# Patient Record
Sex: MALE | Race: WHITE | NOT HISPANIC OR LATINO | Employment: UNEMPLOYED | URBAN - NONMETROPOLITAN AREA
[De-identification: names, ages, dates, MRNs, and addresses within clinical notes are randomized per-mention and may not be internally consistent; named-entity substitution may affect disease eponyms.]

---

## 2017-04-10 ENCOUNTER — PROCEDURE VISIT (OUTPATIENT)
Dept: OTOLARYNGOLOGY | Facility: CLINIC | Age: 57
End: 2017-04-10

## 2017-04-10 DIAGNOSIS — H90.5 HEARING LOSS, SENSORINEURAL: Primary | ICD-10-CM

## 2017-04-10 PROCEDURE — HEARINGNOCHG: Performed by: OTOLARYNGOLOGY

## 2017-04-10 NOTE — PROGRESS NOTES
Patient came in for a HA clean and check. Both aids were cleaned. Patient states he is not having any problems other than, his HA will occasionally turn up in volume on it's own. But he says this is very rare and will call if it becomes more frequent.    No charge per HA agreement.

## 2017-08-28 ENCOUNTER — OFFICE VISIT (OUTPATIENT)
Dept: GASTROENTEROLOGY | Facility: CLINIC | Age: 57
End: 2017-08-28

## 2017-08-28 VITALS
OXYGEN SATURATION: 98 % | HEART RATE: 69 BPM | WEIGHT: 191 LBS | HEIGHT: 72 IN | SYSTOLIC BLOOD PRESSURE: 120 MMHG | BODY MASS INDEX: 25.87 KG/M2 | DIASTOLIC BLOOD PRESSURE: 80 MMHG

## 2017-08-28 DIAGNOSIS — I10 HTN (HYPERTENSION), BENIGN: ICD-10-CM

## 2017-08-28 DIAGNOSIS — Z12.11 ENCOUNTER FOR SCREENING FOR MALIGNANT NEOPLASM OF COLON: Primary | ICD-10-CM

## 2017-08-28 DIAGNOSIS — Z71.6 TOBACCO ABUSE COUNSELING: ICD-10-CM

## 2017-08-28 PROCEDURE — S0285 CNSLT BEFORE SCREEN COLONOSC: HCPCS | Performed by: CLINICAL NURSE SPECIALIST

## 2017-08-28 RX ORDER — SODIUM, POTASSIUM,MAG SULFATES 17.5-3.13G
SOLUTION, RECONSTITUTED, ORAL ORAL
Qty: 2 BOTTLE | Refills: 0 | Status: SHIPPED | OUTPATIENT
Start: 2017-08-28 | End: 2017-11-07 | Stop reason: HOSPADM

## 2017-08-28 NOTE — PROGRESS NOTES
Cale Sebastian  1960 8/28/2017  Chief Complaint   Patient presents with   • Colonoscopy     Subjective   HPI  Cale Sebastian is a 57 y.o. male who presents as a referral for preventative maintenance. He has no complaints of nausea or vomiting. No change in bowels. No wt loss. No BRBPR. No melena. There is a negative family hx for colon cancer. No abdominal pain.  Past Medical History:   Diagnosis Date   • Cigarette nicotine dependence without complication    • Erectile dysfunction    • GERD (gastroesophageal reflux disease)    • Hypertension      Past Surgical History:   Procedure Laterality Date   • COLONOSCOPY  11/21/2012    Diverticulosis repeat exam in 5 years   • COLONOSCOPY  11/21/2012    Diverticulosis repeat exam in 5 years   • ENDOSCOPY  05/24/2016    LA Grade B Esohagitis repeat exam in 3 months   • ENDOSCOPY  11/26/2012    BERKLEY SALEH GEJ ESOPHAGITIS   • FOOT SURGERY Left     AS A CHILD   • HERNIA REPAIR      2ND GRADE     Outpatient Prescriptions Marked as Taking for the 8/28/17 encounter (Office Visit) with JAVIER Anthony   Medication Sig Dispense Refill   • amLODIPine (NORVASC) 5 MG tablet Take 5 mg by mouth daily.     • aspirin 81 MG tablet Take 81 mg by mouth daily.     • Omega-3 Fatty Acids (FISH OIL BURP-LESS) 1200 MG capsule Take  by mouth.     • pantoprazole (PROTONIX) 40 MG EC tablet Take 40 mg by mouth 2 (two) times a day. ONE TABLET DR TWO TIMES DAILY, #30, 30 DAYS STARTING 5/19/2016, REF X 10, MAIL ORDER #90 DAYS, NO REFILL       No Known Allergies  Social History     Social History   • Marital status:      Spouse name: N/A   • Number of children: N/A   • Years of education: N/A     Occupational History   • Not on file.     Social History Main Topics   • Smoking status: Current Every Day Smoker     Types: Electronic Cigarette   • Smokeless tobacco: Never Used   • Alcohol use Yes      Comment: MODERATE ALCOHOL/6 PACK PER WEEK   • Drug use: No   • Sexual activity: Not  "on file     Other Topics Concern   • Not on file     Social History Narrative     Family History   Problem Relation Age of Onset   • Stomach cancer Other    • Colon cancer Neg Hx    • Colon polyps Neg Hx      Health Maintenance   Topic Date Due   • PNEUMOCOCCAL VACCINE (19-64 MEDIUM RISK) (1 of 1 - PPSV23) 04/08/1981   • TDAP/TD VACCINES (1 - Tdap) 04/08/1981   • HEPATITIS C SCREENING  04/10/2017   • INFLUENZA VACCINE  09/01/2017   • COLONOSCOPY  11/21/2022       REVIEW OF SYSTEMS  General: well appearing, no fever chills or sweats, no unexplained wt loss  HEENT: no acute visual or hearing disturbances  Cardiovascular: No chest pain or palpitations  Pulmonary: No shortness of breath, coughing, wheezing or hemoptysis  : No burning, urgency, hematuria, or dysuria  Musculoskeletal: No joint pain or stiffness  Peripheral: no edema  Skin: No lesions or rashes  Neuro: No dizziness, headaches, stroke, syncope  Endocrine: No hot or cold intolerances  Hematological: No blood dyscrasias    Objective   Vitals:    08/28/17 0921   BP: 120/80   Pulse: 69   SpO2: 98%   Weight: 191 lb (86.6 kg)   Height: 72\" (182.9 cm)     Body mass index is 25.9 kg/(m^2).    PHYSICAL EXAM  General: age appropriate well nourished well appearing, no acute distress  Head: normocephalic and atraumatic  Global assessment-supple  Neck-No JVD noted, no lymphadenopathy  Pulmonary-clear to auscultation bilaterally, normal respiratory effort  Cardiovascular-normal rate and rhythm, normal heart sounds, S1 and S2 noted  Abdomen-soft, non tender, non distended, normal bowel sounds all 4 quadrants, no hepatosplenomegaly noted  Extremities-No clubbing cyanosis or edema  Neuro-Non focal, converses appropriately, awake, alert, oriented    Assessment/Plan     Cale was seen today for colonoscopy.    Diagnoses and all orders for this visit:    Encounter for screening for malignant neoplasm of colon  -     SUPREP BOWEL PREP KIT 17.5-3.13-1.6 GM/180ML solution oral " solution; Take as directed by office instructions provided  -     Case Request; Standing  -     Implement Anesthesia Orders Day of Procedure; Standing  -     Obtain Informed Consent; Standing  -     Verify bowel prep was successful; Standing  -     Case Request    HTN (hypertension), benign  Comments:  cont BP medication the am of procedure    Tobacco abuse counseling        COLONOSCOPY WITH ANESTHESIA (N/A)  Body mass index is 25.9 kg/(m^2).    Patient instructions on prep prior to procedure provided to the patient.    All risks, benefits, alternatives, and indications of colonoscopy procedure have been discussed with the patient. Risks to include perforation of the colon requiring possible surgery or colostomy, risk of bleeding from biopsies or removal of colon tissue, possibility of missing a colon polyp or cancer, or adverse drug reaction.  Benefits to include the diagnosis and management of disease of the colon and rectum. Alternatives to include barium enema, radiographic evaluation, lab testing or no intervention. Pt verbalizes understanding and agrees.

## 2017-11-07 ENCOUNTER — ANESTHESIA (OUTPATIENT)
Dept: GASTROENTEROLOGY | Facility: HOSPITAL | Age: 57
End: 2017-11-07

## 2017-11-07 ENCOUNTER — ANESTHESIA EVENT (OUTPATIENT)
Dept: GASTROENTEROLOGY | Facility: HOSPITAL | Age: 57
End: 2017-11-07

## 2017-11-07 ENCOUNTER — HOSPITAL ENCOUNTER (OUTPATIENT)
Facility: HOSPITAL | Age: 57
Setting detail: HOSPITAL OUTPATIENT SURGERY
Discharge: HOME | End: 2017-11-07
Attending: INTERNAL MEDICINE | Admitting: INTERNAL MEDICINE

## 2017-11-07 VITALS
RESPIRATION RATE: 13 BRPM | HEIGHT: 72 IN | TEMPERATURE: 97 F | OXYGEN SATURATION: 98 % | BODY MASS INDEX: 25.47 KG/M2 | WEIGHT: 188 LBS | DIASTOLIC BLOOD PRESSURE: 76 MMHG | SYSTOLIC BLOOD PRESSURE: 130 MMHG | HEART RATE: 59 BPM

## 2017-11-07 DIAGNOSIS — Z12.11 ENCOUNTER FOR SCREENING FOR MALIGNANT NEOPLASM OF COLON: ICD-10-CM

## 2017-11-07 PROCEDURE — G0121 COLON CA SCRN NOT HI RSK IND: HCPCS | Performed by: INTERNAL MEDICINE

## 2017-11-07 PROCEDURE — 25010000002 PROPOFOL 10 MG/ML EMULSION: Performed by: NURSE ANESTHETIST, CERTIFIED REGISTERED

## 2017-11-07 RX ORDER — SODIUM CHLORIDE 9 MG/ML
500 INJECTION, SOLUTION INTRAVENOUS CONTINUOUS PRN
Status: DISCONTINUED | OUTPATIENT
Start: 2017-11-07 | End: 2017-11-07 | Stop reason: HOSPADM

## 2017-11-07 RX ORDER — PROPOFOL 10 MG/ML
VIAL (ML) INTRAVENOUS AS NEEDED
Status: DISCONTINUED | OUTPATIENT
Start: 2017-11-07 | End: 2017-11-07 | Stop reason: SURG

## 2017-11-07 RX ORDER — SODIUM CHLORIDE 0.9 % (FLUSH) 0.9 %
3 SYRINGE (ML) INJECTION AS NEEDED
Status: DISCONTINUED | OUTPATIENT
Start: 2017-11-07 | End: 2017-11-07 | Stop reason: HOSPADM

## 2017-11-07 RX ORDER — LIDOCAINE HYDROCHLORIDE 20 MG/ML
INJECTION, SOLUTION INFILTRATION; PERINEURAL AS NEEDED
Status: DISCONTINUED | OUTPATIENT
Start: 2017-11-07 | End: 2017-11-07 | Stop reason: SURG

## 2017-11-07 RX ADMIN — PROPOFOL 50 MG: 10 INJECTION, EMULSION INTRAVENOUS at 09:06

## 2017-11-07 RX ADMIN — LIDOCAINE HYDROCHLORIDE 50 MG: 20 INJECTION, SOLUTION INFILTRATION; PERINEURAL at 08:52

## 2017-11-07 RX ADMIN — PROPOFOL 70 MG: 10 INJECTION, EMULSION INTRAVENOUS at 08:57

## 2017-11-07 RX ADMIN — SODIUM CHLORIDE 500 ML: 9 INJECTION, SOLUTION INTRAVENOUS at 07:54

## 2017-11-07 RX ADMIN — PROPOFOL 80 MG: 10 INJECTION, EMULSION INTRAVENOUS at 08:52

## 2017-11-07 RX ADMIN — PROPOFOL 70 MG: 10 INJECTION, EMULSION INTRAVENOUS at 09:01

## 2017-11-07 NOTE — PLAN OF CARE
Problem: GI Endoscopy (Adult)  Goal: Signs and Symptoms of Listed Potential Problems Will be Absent or Manageable (GI Endoscopy)  Outcome: Outcome(s) achieved Date Met:  11/07/17 11/07/17 0915   GI Endoscopy   Problems Assessed (GI Endoscopy) all   Problems Present (GI Endoscopy) none

## 2017-11-07 NOTE — ANESTHESIA PREPROCEDURE EVALUATION
Anesthesia Evaluation     Patient summary reviewed   no history of anesthetic complications:         Airway   Mallampati: II  TM distance: >3 FB  Neck ROM: full  no difficulty expected  Dental - normal exam     Pulmonary    (+) a smoker (vaper) Current,   (-) COPD, asthma, sleep apnea  Cardiovascular   Exercise tolerance: good (4-7 METS)    (+) hypertension,       Neuro/Psych  (-) seizures, TIA, CVA  GI/Hepatic/Renal/Endo    (+)  GERD,   (-) liver disease, no renal disease, diabetes    Musculoskeletal     Abdominal    Substance History      OB/GYN          Other                                        Anesthesia Plan    ASA 2     general   total IV anesthesia  intravenous induction   Anesthetic plan and risks discussed with patient.

## 2017-11-07 NOTE — PLAN OF CARE
Problem: Patient Care Overview (Adult)  Goal: Plan of Care Review  Outcome: Ongoing (interventions implemented as appropriate)    11/07/17 0854   Coping/Psychosocial Response Interventions   Plan Of Care Reviewed With patient   Patient Care Overview   Progress no change   Outcome Evaluation   Outcome Summary/Follow up Plan pt tolerated procedure well.

## 2017-11-07 NOTE — PLAN OF CARE
Problem: Patient Care Overview (Adult)  Goal: Plan of Care Review  Outcome: Outcome(s) achieved Date Met:  11/07/17 11/07/17 0915   Coping/Psychosocial Response Interventions   Plan Of Care Reviewed With patient;spouse   Patient Care Overview   Progress improving   Outcome Evaluation   Outcome Summary/Follow up Plan discharge criteria met

## 2017-11-07 NOTE — ANESTHESIA POSTPROCEDURE EVALUATION
Patient: Cale Sebastian    Procedure Summary     Date Anesthesia Start Anesthesia Stop Room / Location    11/07/17 0850 0914  [facility] /  [facility]       Procedure Diagnosis Surgeon Provider    COLONOSCOPY WITH ANESTHESIA (N/A ) Encounter for screening for malignant neoplasm of colon  (Encounter for screening for malignant neoplasm of colon [Z12.11]) MD Duane Nguyễn CRNA          Anesthesia Type: general  Last vitals  BP   102/65 (11/07/17 0912)   Temp   97 °F (36.1 °C) (11/07/17 0741)   Pulse   64 (11/07/17 0912)   Resp   16 (11/07/17 0912)     SpO2   96 % (11/07/17 0912)     Post Anesthesia Care and Evaluation    Patient location during evaluation: PHASE II  Patient participation: complete - patient participated  Level of consciousness: awake  Pain score: 1  Pain management: adequate  Airway patency: patent  Anesthetic complications: No anesthetic complications  PONV Status: none  Cardiovascular status: acceptable  Respiratory status: acceptable  Hydration status: acceptable

## 2017-11-07 NOTE — H&P
"[facility]  Pre Procedure History & Physical    Chief Complaint:   Screening    Subjective     HPI:   Here for colonoscopy.  Strong family history of gastric cancer.  Here for colonoscopy for screening    Past Medical History:   Past Medical History:   Diagnosis Date   • Cigarette nicotine dependence without complication    • Erectile dysfunction    • GERD (gastroesophageal reflux disease)    • Hypertension        Past Surgical History:  [unfilled]    Family History:  Family History   Problem Relation Age of Onset   • Stomach cancer Other    • Colon cancer Neg Hx    • Colon polyps Neg Hx        Social History:   reports that he has been smoking Electronic Cigarette.  He has never used smokeless tobacco. He reports that he drinks alcohol. He reports that he does not use illicit drugs.    Medications:   Prior to Admission medications    Medication Sig Start Date End Date Taking? Authorizing Provider   amLODIPine (NORVASC) 5 MG tablet Take 5 mg by mouth daily.   Yes Historical Provider, MD   aspirin 81 MG tablet Take 81 mg by mouth daily.    Historical Provider, MD   Omega-3 Fatty Acids (FISH OIL BURP-LESS) 1200 MG capsule Take  by mouth.    Historical Provider, MD   pantoprazole (PROTONIX) 40 MG EC tablet Take 40 mg by mouth 2 (two) times a day. ONE TABLET DR TWO TIMES DAILY, #30, 30 DAYS STARTING 5/19/2016, REF X 10, MAIL ORDER #90 DAYS, NO REFILL    Historical Provider, MD   SUPREP BOWEL PREP KIT 17.5-3.13-1.6 GM/180ML solution oral solution Take as directed by office instructions provided 8/28/17   JAVIER Anthony       Allergies:  Review of patient's allergies indicates no known allergies.    Objective     Blood pressure 151/85, pulse 63, temperature 97 °F (36.1 °C), temperature source Temporal Artery , resp. rate 20, height 72\" (182.9 cm), weight 188 lb (85.3 kg), SpO2 99 %.    Physical Exam   Constitutional: Pt is oriented to person, place, and in no distress.   HENT: Mouth/Throat: " Oropharynx is clear.   Cardiovascular: Normal rate, regular rhythm.    Pulmonary/Chest: Effort normal. No respiratory distress. No  wheezes.   Abdominal: Soft. Non-distended.  Skin: Skin is warm and dry.   Psychiatric: Mood, memory, affect and judgment appear normal.     Assessment/Plan     Diagnosis:  Screening colonoscopy    Anticipated Surgical Procedure:    Proceed with colonoscopy as scheduled    The following major R/B/A were discussed with the patient, however the list is not all inclusive . Risk:  Bleeding (immediate and delayed), perforation (rupture or tear), reaction to medication, missed lesion/cancer, pain during the procedure, infection, need for surgery, need for ostomy, need for mechanical ventilation (breathing machine), death.  Benefits: removal of polyp/tissue, burn/clip/or inject to stop bleeding, removal of foreign body, dilate any stricture.  Alternatives: Xray or CT, surgery, do nothing with associated risk   The patient was given time to ask question and received explanation, and agrees to proceed as per History and Physical.   No guarantee given or expressed.    EMR Dragon/transcription disclaimer: Much of this encounter note is an electronic transcription/translation of spoken language to printed text.  The electronic translation of spoken language may permit erroneous, or at times, nonsensical words or phrases to be inadvertently transcribed.  Although I have reviewed the note for such errors, some may still exist.    Terrence Wood MD  8:52 AM  11/7/2017

## 2017-11-16 ENCOUNTER — TELEPHONE (OUTPATIENT)
Dept: GASTROENTEROLOGY | Facility: CLINIC | Age: 57
End: 2017-11-16

## 2018-01-21 DIAGNOSIS — K21.9 GASTROESOPHAGEAL REFLUX DISEASE, ESOPHAGITIS PRESENCE NOT SPECIFIED: Primary | ICD-10-CM

## 2018-01-22 RX ORDER — SUCRALFATE 1 G/1
TABLET ORAL
Qty: 120 TABLET | Refills: 5 | Status: SHIPPED | OUTPATIENT
Start: 2018-01-22 | End: 2019-02-14

## 2019-02-14 ENCOUNTER — APPOINTMENT (OUTPATIENT)
Dept: PREADMISSION TESTING | Facility: HOSPITAL | Age: 59
End: 2019-02-14

## 2019-02-14 VITALS
RESPIRATION RATE: 20 BRPM | DIASTOLIC BLOOD PRESSURE: 67 MMHG | BODY MASS INDEX: 25.14 KG/M2 | HEIGHT: 72 IN | HEART RATE: 77 BPM | OXYGEN SATURATION: 98 % | WEIGHT: 185.63 LBS | SYSTOLIC BLOOD PRESSURE: 111 MMHG

## 2019-02-14 LAB
ALBUMIN SERPL-MCNC: 4.1 G/DL (ref 3.5–5)
ALBUMIN/GLOB SERPL: 1.9 G/DL (ref 1.1–2.5)
ALP SERPL-CCNC: 66 U/L (ref 24–120)
ALT SERPL W P-5'-P-CCNC: 27 U/L (ref 0–54)
ANION GAP SERPL CALCULATED.3IONS-SCNC: 6 MMOL/L (ref 4–13)
AST SERPL-CCNC: 24 U/L (ref 7–45)
BASOPHILS # BLD AUTO: 0.06 10*3/MM3 (ref 0–0.2)
BASOPHILS NFR BLD AUTO: 0.8 % (ref 0–2)
BILIRUB SERPL-MCNC: 0.4 MG/DL (ref 0.1–1)
BUN BLD-MCNC: 18 MG/DL (ref 5–21)
BUN/CREAT SERPL: 18 (ref 7–25)
CALCIUM SPEC-SCNC: 8.9 MG/DL (ref 8.4–10.4)
CHLORIDE SERPL-SCNC: 106 MMOL/L (ref 98–110)
CO2 SERPL-SCNC: 27 MMOL/L (ref 24–31)
CREAT BLD-MCNC: 1 MG/DL (ref 0.5–1.4)
DEPRECATED RDW RBC AUTO: 44.9 FL (ref 40–54)
EOSINOPHIL # BLD AUTO: 0.38 10*3/MM3 (ref 0–0.7)
EOSINOPHIL NFR BLD AUTO: 5.3 % (ref 0–4)
ERYTHROCYTE [DISTWIDTH] IN BLOOD BY AUTOMATED COUNT: 13.2 % (ref 12–15)
GFR SERPL CREATININE-BSD FRML MDRD: 77 ML/MIN/1.73
GLOBULIN UR ELPH-MCNC: 2.2 GM/DL
GLUCOSE BLD-MCNC: 97 MG/DL (ref 70–100)
HCT VFR BLD AUTO: 46.8 % (ref 40–52)
HGB BLD-MCNC: 16.2 G/DL (ref 14–18)
IMM GRANULOCYTES # BLD AUTO: 0.02 10*3/MM3 (ref 0–0.05)
IMM GRANULOCYTES NFR BLD AUTO: 0.3 % (ref 0–5)
LYMPHOCYTES # BLD AUTO: 1.59 10*3/MM3 (ref 0.72–4.86)
LYMPHOCYTES NFR BLD AUTO: 22.2 % (ref 15–45)
MCH RBC QN AUTO: 32.1 PG (ref 28–32)
MCHC RBC AUTO-ENTMCNC: 34.6 G/DL (ref 33–36)
MCV RBC AUTO: 92.7 FL (ref 82–95)
MONOCYTES # BLD AUTO: 0.87 10*3/MM3 (ref 0.19–1.3)
MONOCYTES NFR BLD AUTO: 12.2 % (ref 4–12)
NEUTROPHILS # BLD AUTO: 4.23 10*3/MM3 (ref 1.87–8.4)
NEUTROPHILS NFR BLD AUTO: 59.2 % (ref 39–78)
NRBC BLD AUTO-RTO: 0 /100 WBC (ref 0–0)
PLATELET # BLD AUTO: 295 10*3/MM3 (ref 130–400)
PMV BLD AUTO: 9.9 FL (ref 6–12)
POTASSIUM BLD-SCNC: 4.4 MMOL/L (ref 3.5–5.3)
PROT SERPL-MCNC: 6.3 G/DL (ref 6.3–8.7)
RBC # BLD AUTO: 5.05 10*6/MM3 (ref 4.8–5.9)
SODIUM BLD-SCNC: 139 MMOL/L (ref 135–145)
WBC NRBC COR # BLD: 7.15 10*3/MM3 (ref 4.8–10.8)

## 2019-02-14 PROCEDURE — 85025 COMPLETE CBC W/AUTO DIFF WBC: CPT | Performed by: SPECIALIST

## 2019-02-14 PROCEDURE — 93010 ELECTROCARDIOGRAM REPORT: CPT | Performed by: INTERNAL MEDICINE

## 2019-02-14 PROCEDURE — 80053 COMPREHEN METABOLIC PANEL: CPT | Performed by: SPECIALIST

## 2019-02-14 PROCEDURE — 93005 ELECTROCARDIOGRAM TRACING: CPT

## 2019-02-14 PROCEDURE — 36415 COLL VENOUS BLD VENIPUNCTURE: CPT

## 2019-02-14 NOTE — DISCHARGE INSTRUCTIONS
DAY OF SURGERY INSTRUCTIONS        YOUR SURGEON: ***Dr. CHRIST Meza    PROCEDURE: ***Left inguinal hernia    DATE OF SURGERY: ***2/21/2019    ARRIVAL TIME: AS DIRECTED BY OFFICE    YOU MAY TAKE THE FOLLOWING MEDICATION(S) THE MORNING OF SURGERY WITH A SIP OF WATER: ***amlodipine                MANAGING PAIN AFTER SURGERY    We know you are probably wondering what your pain will be like after surgery.  Following surgery it is unrealistic to expect you will not have pain.   Pain is how our bodies let us know that something is wrong or cautions us to be careful.  That said, our goal is to make your pain tolerable.    Methods we may use to treat your pain include (oral or IV medications, PCAs, epidurals, nerve blocks, etc.)   While some procedures require IV pain medications for a short time after surgery, transitioning to pain medications by mouth allows for better management of pain.   Your nurse will encourage you to take oral pain medications whenever possible.  IV medications work almost immediately, but only last a short while.  Taking medications by mouth allows for a more constant level of medication in your blood stream for a longer period of time.      Once your pain is out of control it is harder to get back under control.  It is important you are aware when your next dose of pain medication is due.  If you are admitted, your nurse may write the time of your next dose on the white board in your room to help you remember.      We are interested in your pain and encourage you to inform us about aggravating factors during your visit.   Many times a simple repositioning every few hours can make a big difference.    If your physician says it is okay, do not let your pain prevent you from getting out of bed. Be sure to call your nurse for assistance prior to getting up so you do not fall.      Before surgery, please decide your tolerable pain goal.  These faces help describe the pain ratings we use on a 0-10 scale.    Be prepared to tell us your goal and whether or not you take pain or anxiety medications at home.          BEFORE YOU COME TO THE HOSPITAL  (Pre-op instructions)  • Do not eat, drink, smoke or chew gum after midnight the night before surgery.  This also includes no mints.  • Morning of surgery take only the medicines you have been instructed with a sip of water unless otherwise instructed  by your physician.  • Do not shave, wear makeup or dark nail polish.  • Remove all jewelry including rings.  • Leave anything you consider valuable at home.  • Leave your suitcase in the car until after your surgery.  • Bring the following with you if applicable:  o Picture ID and insurance, Medicare or Medicaid cards  o Co-pay/deductible required by insurance (cash, check, credit card)  o Copy of advance directive, living will or power-of- documents if not brought to PAT  o CPAP or BIPAP mask and tubing  o Relaxation aids (MP3 player, book, magazine)  • On the day of surgery check in at registration located at the main entrance of the hospital.       Outpatient Surgery Guidelines, Adult  Outpatient procedures are those for which the person having the procedure is allowed to go home the same day as the procedure. Various procedures are done on an outpatient basis. You should follow some general guidelines if you will be having an outpatient procedure.  LET YOUR HEALTH CARE PROVIDER KNOW ABOUT:  · Any allergies you have.  · All medicines you are taking, including vitamins, herbs, eye drops, creams, and over-the-counter medicines.  · Previous problems you or members of your family have had with the use of anesthetics.  · Any blood disorders you have.  · Previous surgeries you have had.  · Medical conditions you have.  RISKS AND COMPLICATIONS  Your health care provider will discuss possible risks and complications with you before surgery. Common risks and complications include:    · Problems due to the use of  anesthetics.  · Blood loss and replacement (does not apply to minor surgical procedures).  · Temporary increase in pain due to surgery.  · Uncorrected pain or problems that the surgery was meant to correct.  · Infection.  · New damage.  BEFORE THE PROCEDURE  · Ask your health care provider about changing or stopping your regular medicines. You may need to stop taking certain medicines in the days or weeks before the procedure.  · Stop smoking at least 2 weeks before surgery. This lowers your risk for complications during and after surgery. Ask your health care provider for help with this if needed.  · Eat your usual meals and a light supper the day before surgery. Continue fluid intake. Do not drink alcohol.  · Do not eat or drink after midnight the night before your surgery.   · Arrange for someone to take you home and to stay with you for 24 hours after the procedure. Medicine given for your procedure may affect your ability to drive or to care for yourself.  · Call your health care provider's office if you develop an illness or problem that may prevent you from safely having your procedure.  AFTER THE PROCEDURE  After surgery, you will be taken to a recovery area, where your progress will be monitored. If there are no complications, you will be allowed to go home when you are awake, stable, and taking fluids well. You may have numbness around the surgical site. Healing will take some time. You will have tenderness at the surgical site and may have some swelling and bruising. You may also have some nausea.  HOME CARE INSTRUCTIONS  · Do not drive for 24 hours, or as directed by your health care provider. Do not drive while taking prescription pain medicines.  · Do not drink alcohol for 24 hours.  · Do not make important decisions or sign legal documents for 24 hours.  · You may resume a normal diet and activities as directed.  · Do not lift anything heavier than 10 pounds (4.5 kg) or play contact sports until your  health care provider says it is okay.  · Change your bandages (dressings) as directed.  · Only take over-the-counter or prescription medicines as directed by your health care provider.  · Follow up with your health care provider as directed.  SEEK MEDICAL CARE IF:  · You have increased bleeding (more than a small spot) from the surgical site.  · You have redness, swelling, or increasing pain in the wound.  · You see pus coming from the wound.  · You have a fever.  · You notice a bad smell coming from the wound or dressing.  · You feel lightheaded or faint.  · You develop a rash.  · You have trouble breathing.  · You develop allergies.  MAKE SURE YOU:  · Understand these instructions.  · Will watch your condition.  · Will get help right away if you are not doing well or get worse.     This information is not intended to replace advice given to you by your health care provider. Make sure you discuss any questions you have with your health care provider.     Document Released: 09/12/2002 Document Revised: 05/03/2016 Document Reviewed: 05/22/2014  [employer] Interactive Patient Education ©2016 [employer]       Fall Prevention in Hospitals, Adult  As a hospital patient, your condition and the treatments you receive can increase your risk for falls. Some additional risk factors for falls in a hospital include:  · Being in an unfamiliar environment.  · Being on bed rest.  · Your surgery.  · Taking certain medicines.  · Your tubing requirements, such as intravenous (IV) therapy or catheters.  It is important that you learn how to decrease fall risks while at the hospital. Below are important tips that can help prevent falls.  SAFETY TIPS FOR PREVENTING FALLS  Talk about your risk of falling.  · Ask your health care provider why you are at risk for falling. Is it your medicine, illness, tubing placement, or something else?  · Make a plan with your health care provider to keep you safe from falls.  · Ask your health care  provider or pharmacist about side effects of your medicines. Some medicines can make you dizzy or affect your coordination.  Ask for help.  · Ask for help before getting out of bed. You may need to press your call button.  · Ask for assistance in getting safely to the toilet.  · Ask for a walker or cane to be put at your bedside. Ask that most of the side rails on your bed be placed up before your health care provider leaves the room.  · Ask family or friends to sit with you.  · Ask for things that are out of your reach, such as your glasses, hearing aids, telephone, bedside table, or call button.  Follow these tips to avoid falling:  · Stay lying or seated, rather than standing, while waiting for help.  · Wear rubber-soled slippers or shoes whenever you walk in the hospital.  · Avoid quick, sudden movements.  ¨ Change positions slowly.  ¨ Sit on the side of your bed before standing.  ¨ Stand up slowly and wait before you start to walk.  · Let your health care provider know if there is a spill on the floor.  · Pay careful attention to the medical equipment, electrical cords, and tubes around you.  · When you need help, use your call button by your bed or in the bathroom. Wait for one of your health care providers to help you.  · If you feel dizzy or unsure of your footing, return to bed and wait for assistance.  · Avoid being distracted by the TV, telephone, or another person in your room.  · Do not lean or support yourself on rolling objects, such as IV poles or bedside tables.     This information is not intended to replace advice given to you by your health care provider. Make sure you discuss any questions you have with your health care provider.     Document Released: 12/15/2001 Document Revised: 01/08/2016 Document Reviewed: 08/25/2013  [employer] Interactive Patient Education ©2016 [employer]       Surgical Site Infections FAQs  What is a Surgical Site Infection (SSI)?  A surgical site infection is an  infection that occurs after surgery in the part of the body where the surgery took place. Most patients who have surgery do not develop an infection. However, infections develop in about 1 to 3 out of every 100 patients who have surgery.  Some of the common symptoms of a surgical site infection are:  · Redness and pain around the area where you had surgery  · Drainage of cloudy fluid from your surgical wound  · Fever  Can SSIs be treated?  Yes. Most surgical site infections can be treated with antibiotics. The antibiotic given to you depends on the bacteria (germs) causing the infection. Sometimes patients with SSIs also need another surgery to treat the infection.  What are some of the things that hospitals are doing to prevent SSIs?  To prevent SSIs, doctors, nurses, and other healthcare providers:  · Clean their hands and arms up to their elbows with an antiseptic agent just before the surgery.  · Clean their hands with soap and water or an alcohol-based hand rub before and after caring for each patient.  · May remove some of your hair immediately before your surgery using electric clippers if the hair is in the same area where the procedure will occur. They should not shave you with a razor.  · Wear special hair covers, masks, gowns, and gloves during surgery to keep the surgery area clean.  · Give you antibiotics before your surgery starts. In most cases, you should get antibiotics within 60 minutes before the surgery starts and the antibiotics should be stopped within 24 hours after surgery.  · Clean the skin at the site of your surgery with a special soap that kills germs.  What can I do to help prevent SSIs?  Before your surgery:  · Tell your doctor about other medical problems you may have. Health problems such as allergies, diabetes, and obesity could affect your surgery and your treatment.  · Quit smoking. Patients who smoke get more infections. Talk to your doctor about how you can quit before your  surgery.  · Do not shave near where you will have surgery. Shaving with a razor can irritate your skin and make it easier to develop an infection.  At the time of your surgery:  · Speak up if someone tries to shave you with a razor before surgery. Ask why you need to be shaved and talk with your surgeon if you have any concerns.  · Ask if you will get antibiotics before surgery.  After your surgery:  · Make sure that your healthcare providers clean their hands before examining you, either with soap and water or an alcohol-based hand rub.  · If you do not see your providers clean their hands, please ask them to do so.  · Family and friends who visit you should not touch the surgical wound or dressings.  · Family and friends should clean their hands with soap and water or an alcohol-based hand rub before and after visiting you. If you do not see them clean their hands, ask them to clean their hands.  What do I need to do when I go home from the hospital?  · Before you go home, your doctor or nurse should explain everything you need to know about taking care of your wound. Make sure you understand how to care for your wound before you leave the hospital.  · Always clean your hands before and after caring for your wound.  · Before you go home, make sure you know who to contact if you have questions or problems after you get home.  · If you have any symptoms of an infection, such as redness and pain at the surgery site, drainage, or fever, call your doctor immediately.  If you have additional questions, please ask your doctor or nurse.  Developed and co-sponsored by The Society for Healthcare Epidemiology of [place] (SHEA); Infectious Diseases Society of [place] (IDSA); American Hospital Association; Association for Professionals in Infection Control and Epidemiology (APIC); Centers for Disease Control and Prevention (CDC); and The Joint Commission.     This information is not intended to replace advice given to you by  your health care provider. Make sure you discuss any questions you have with your health care provider.     Document Released: 12/23/2014 Document Revised: 01/08/2016 Document Reviewed: 03/02/2016  [employer] Interactive Patient Education ©2016 [employer]       [facility]  CHG 4% Patient Instruction Sheet    Preparing the Skin Before Surgery  Preparing or “prepping” skin before surgery can reduce the risk of infection at the surgical site. To make the process easier,[facility] has chosen 4% Chlorhexidine Gluconate (CHG) antiseptic solution.   The steps below outline the prepping process and should be carefully followed.                                                                                                                                                      Prep the skin at the following time(s):                                                      We recommend you shower the night before surgery, and again the morning of surgery with the 4% CHG antiseptic solution using half of the bottle and a cloth each time.  Dress in clean clothes/sleepwear after showering.  See instructions below for application.          Do not apply any lotions or moisturizers.       Do not shave the area to be prepped for at least 2 days prior to surgery.    Clipping the hair may be done immediately prior to your surgery at the hospital    if needed.    Directions:  Thoroughly rinse your body with water.  Apply 4% CHG to a cloth and wash skin gently, paying special attention to the operative site.  Rinse again thoroughly.  Once you have begun using this product do not apply anything else to your skin. If itching or redness persists, rinse affected areas and discontinue use.    When using this product:  • Keep out of eyes, ears, and mouth.  • If solution should contact these areas, rinse out promptly and thoroughly with water.  • For external use only.  • Do not use in genital area, on your face or head.

## 2019-02-21 ENCOUNTER — ANESTHESIA EVENT (OUTPATIENT)
Dept: PERIOP | Facility: HOSPITAL | Age: 59
End: 2019-02-21

## 2019-02-21 ENCOUNTER — HOSPITAL ENCOUNTER (OUTPATIENT)
Facility: HOSPITAL | Age: 59
Setting detail: HOSPITAL OUTPATIENT SURGERY
Discharge: HOME | End: 2019-02-21
Attending: SPECIALIST | Admitting: SPECIALIST

## 2019-02-21 ENCOUNTER — ANESTHESIA (OUTPATIENT)
Dept: PERIOP | Facility: HOSPITAL | Age: 59
End: 2019-02-21

## 2019-02-21 VITALS
SYSTOLIC BLOOD PRESSURE: 121 MMHG | TEMPERATURE: 98.1 F | DIASTOLIC BLOOD PRESSURE: 71 MMHG | RESPIRATION RATE: 14 BRPM | HEART RATE: 59 BPM | OXYGEN SATURATION: 95 %

## 2019-02-21 PROCEDURE — 25010000002 VANCOMYCIN 1 G RECONSTITUTED SOLUTION 1 EACH VIAL: Performed by: SPECIALIST

## 2019-02-21 PROCEDURE — 25010000002 SUCCINYLCHOLINE PER 20 MG: Performed by: NURSE ANESTHETIST, CERTIFIED REGISTERED

## 2019-02-21 PROCEDURE — 25010000002 DEXAMETHASONE PER 1 MG: Performed by: NURSE ANESTHETIST, CERTIFIED REGISTERED

## 2019-02-21 PROCEDURE — 25010000002 MIDAZOLAM PER 1 MG: Performed by: ANESTHESIOLOGY

## 2019-02-21 PROCEDURE — 25010000002 PROPOFOL 10 MG/ML EMULSION: Performed by: NURSE ANESTHETIST, CERTIFIED REGISTERED

## 2019-02-21 PROCEDURE — 25010000002 ROPIVACAINE PER 1 MG: Performed by: ANESTHESIOLOGY

## 2019-02-21 PROCEDURE — 25010000002 FENTANYL CITRATE (PF) 100 MCG/2ML SOLUTION: Performed by: NURSE ANESTHETIST, CERTIFIED REGISTERED

## 2019-02-21 PROCEDURE — 25010000002 ONDANSETRON PER 1 MG: Performed by: ANESTHESIOLOGY

## 2019-02-21 PROCEDURE — C1781 MESH (IMPLANTABLE): HCPCS | Performed by: SPECIALIST

## 2019-02-21 PROCEDURE — 25010000002 ONDANSETRON PER 1 MG: Performed by: NURSE ANESTHETIST, CERTIFIED REGISTERED

## 2019-02-21 PROCEDURE — 25010000002 NEOSTIGMINE PER 0.5 MG: Performed by: NURSE ANESTHETIST, CERTIFIED REGISTERED

## 2019-02-21 PROCEDURE — 25010000003 CEFAZOLIN PER 500 MG: Performed by: NURSE ANESTHETIST, CERTIFIED REGISTERED

## 2019-02-21 PROCEDURE — 25010000002 FENTANYL CITRATE (PF) 100 MCG/2ML SOLUTION: Performed by: ANESTHESIOLOGY

## 2019-02-21 DEVICE — BARD MESH PERFIX PLUG, EXTRA LARGE
Type: IMPLANTABLE DEVICE | Status: FUNCTIONAL
Brand: BARD MESH PERFIX PLUG

## 2019-02-21 RX ORDER — SODIUM CHLORIDE, SODIUM LACTATE, POTASSIUM CHLORIDE, CALCIUM CHLORIDE 600; 310; 30; 20 MG/100ML; MG/100ML; MG/100ML; MG/100ML
1000 INJECTION, SOLUTION INTRAVENOUS CONTINUOUS
Status: DISCONTINUED | OUTPATIENT
Start: 2019-02-21 | End: 2019-02-21 | Stop reason: HOSPADM

## 2019-02-21 RX ORDER — HYDROCODONE BITARTRATE AND ACETAMINOPHEN 5; 325 MG/1; MG/1
1-2 TABLET ORAL EVERY 4 HOURS PRN
Qty: 30 TABLET | Refills: 0 | Status: SHIPPED | OUTPATIENT
Start: 2019-02-21 | End: 2021-01-20

## 2019-02-21 RX ORDER — SODIUM CHLORIDE 0.9 % (FLUSH) 0.9 %
3 SYRINGE (ML) INJECTION EVERY 12 HOURS SCHEDULED
Status: DISCONTINUED | OUTPATIENT
Start: 2019-02-21 | End: 2019-02-21 | Stop reason: HOSPADM

## 2019-02-21 RX ORDER — ROPIVACAINE HYDROCHLORIDE 2 MG/ML
INJECTION, SOLUTION EPIDURAL; INFILTRATION; PERINEURAL
Status: COMPLETED | OUTPATIENT
Start: 2019-02-21 | End: 2019-02-21

## 2019-02-21 RX ORDER — IBUPROFEN 600 MG/1
600 TABLET ORAL ONCE AS NEEDED
Status: DISCONTINUED | OUTPATIENT
Start: 2019-02-21 | End: 2019-02-21 | Stop reason: HOSPADM

## 2019-02-21 RX ORDER — ROPIVACAINE HYDROCHLORIDE 5 MG/ML
INJECTION, SOLUTION EPIDURAL; INFILTRATION; PERINEURAL
Status: COMPLETED | OUTPATIENT
Start: 2019-02-21 | End: 2019-02-21

## 2019-02-21 RX ORDER — OXYCODONE AND ACETAMINOPHEN 10; 325 MG/1; MG/1
1 TABLET ORAL ONCE AS NEEDED
Status: DISCONTINUED | OUTPATIENT
Start: 2019-02-21 | End: 2019-02-21 | Stop reason: HOSPADM

## 2019-02-21 RX ORDER — FENTANYL CITRATE 50 UG/ML
INJECTION, SOLUTION INTRAMUSCULAR; INTRAVENOUS AS NEEDED
Status: DISCONTINUED | OUTPATIENT
Start: 2019-02-21 | End: 2019-02-21 | Stop reason: SURG

## 2019-02-21 RX ORDER — NALOXONE HCL 0.4 MG/ML
0.4 VIAL (ML) INJECTION AS NEEDED
Status: DISCONTINUED | OUTPATIENT
Start: 2019-02-21 | End: 2019-02-21 | Stop reason: HOSPADM

## 2019-02-21 RX ORDER — SODIUM CHLORIDE 0.9 % (FLUSH) 0.9 %
1-10 SYRINGE (ML) INJECTION AS NEEDED
Status: DISCONTINUED | OUTPATIENT
Start: 2019-02-21 | End: 2019-02-21 | Stop reason: HOSPADM

## 2019-02-21 RX ORDER — ROCURONIUM BROMIDE 10 MG/ML
INJECTION, SOLUTION INTRAVENOUS AS NEEDED
Status: DISCONTINUED | OUTPATIENT
Start: 2019-02-21 | End: 2019-02-21 | Stop reason: SURG

## 2019-02-21 RX ORDER — DEXAMETHASONE SODIUM PHOSPHATE 4 MG/ML
INJECTION, SOLUTION INTRA-ARTICULAR; INTRALESIONAL; INTRAMUSCULAR; INTRAVENOUS; SOFT TISSUE AS NEEDED
Status: DISCONTINUED | OUTPATIENT
Start: 2019-02-21 | End: 2019-02-21 | Stop reason: SURG

## 2019-02-21 RX ORDER — METOCLOPRAMIDE HYDROCHLORIDE 5 MG/ML
5 INJECTION INTRAMUSCULAR; INTRAVENOUS
Status: DISCONTINUED | OUTPATIENT
Start: 2019-02-21 | End: 2019-02-21 | Stop reason: HOSPADM

## 2019-02-21 RX ORDER — FAMOTIDINE 10 MG/ML
20 INJECTION, SOLUTION INTRAVENOUS
Status: DISCONTINUED | OUTPATIENT
Start: 2019-02-21 | End: 2019-02-21 | Stop reason: HOSPADM

## 2019-02-21 RX ORDER — ONDANSETRON 2 MG/ML
INJECTION INTRAMUSCULAR; INTRAVENOUS AS NEEDED
Status: DISCONTINUED | OUTPATIENT
Start: 2019-02-21 | End: 2019-02-21 | Stop reason: SURG

## 2019-02-21 RX ORDER — MEPERIDINE HYDROCHLORIDE 25 MG/ML
12.5 INJECTION INTRAMUSCULAR; INTRAVENOUS; SUBCUTANEOUS
Status: DISCONTINUED | OUTPATIENT
Start: 2019-02-21 | End: 2019-02-21 | Stop reason: HOSPADM

## 2019-02-21 RX ORDER — SODIUM CHLORIDE 0.9 % (FLUSH) 0.9 %
3 SYRINGE (ML) INJECTION AS NEEDED
Status: DISCONTINUED | OUTPATIENT
Start: 2019-02-21 | End: 2019-02-21 | Stop reason: HOSPADM

## 2019-02-21 RX ORDER — SUCCINYLCHOLINE CHLORIDE 20 MG/ML
INJECTION INTRAMUSCULAR; INTRAVENOUS AS NEEDED
Status: DISCONTINUED | OUTPATIENT
Start: 2019-02-21 | End: 2019-02-21 | Stop reason: SURG

## 2019-02-21 RX ORDER — MIDAZOLAM HYDROCHLORIDE 1 MG/ML
1 INJECTION INTRAMUSCULAR; INTRAVENOUS
Status: DISCONTINUED | OUTPATIENT
Start: 2019-02-21 | End: 2019-02-21 | Stop reason: HOSPADM

## 2019-02-21 RX ORDER — LABETALOL HYDROCHLORIDE 5 MG/ML
5 INJECTION, SOLUTION INTRAVENOUS
Status: DISCONTINUED | OUTPATIENT
Start: 2019-02-21 | End: 2019-02-21 | Stop reason: HOSPADM

## 2019-02-21 RX ORDER — BUPIVACAINE HYDROCHLORIDE AND EPINEPHRINE 2.5; 5 MG/ML; UG/ML
INJECTION, SOLUTION INFILTRATION; PERINEURAL AS NEEDED
Status: DISCONTINUED | OUTPATIENT
Start: 2019-02-21 | End: 2019-02-21 | Stop reason: HOSPADM

## 2019-02-21 RX ORDER — IPRATROPIUM BROMIDE AND ALBUTEROL SULFATE 2.5; .5 MG/3ML; MG/3ML
3 SOLUTION RESPIRATORY (INHALATION) ONCE AS NEEDED
Status: DISCONTINUED | OUTPATIENT
Start: 2019-02-21 | End: 2019-02-21 | Stop reason: HOSPADM

## 2019-02-21 RX ORDER — FENTANYL CITRATE 50 UG/ML
25 INJECTION, SOLUTION INTRAMUSCULAR; INTRAVENOUS AS NEEDED
Status: DISCONTINUED | OUTPATIENT
Start: 2019-02-21 | End: 2019-02-21 | Stop reason: HOSPADM

## 2019-02-21 RX ORDER — GLYCOPYRROLATE 0.2 MG/ML
INJECTION INTRAMUSCULAR; INTRAVENOUS AS NEEDED
Status: DISCONTINUED | OUTPATIENT
Start: 2019-02-21 | End: 2019-02-21 | Stop reason: SURG

## 2019-02-21 RX ORDER — PROPOFOL 10 MG/ML
VIAL (ML) INTRAVENOUS AS NEEDED
Status: DISCONTINUED | OUTPATIENT
Start: 2019-02-21 | End: 2019-02-21 | Stop reason: SURG

## 2019-02-21 RX ORDER — ONDANSETRON 2 MG/ML
4 INJECTION INTRAMUSCULAR; INTRAVENOUS ONCE AS NEEDED
Status: COMPLETED | OUTPATIENT
Start: 2019-02-21 | End: 2019-02-21

## 2019-02-21 RX ORDER — SODIUM CHLORIDE, SODIUM LACTATE, POTASSIUM CHLORIDE, CALCIUM CHLORIDE 600; 310; 30; 20 MG/100ML; MG/100ML; MG/100ML; MG/100ML
100 INJECTION, SOLUTION INTRAVENOUS CONTINUOUS
Status: DISCONTINUED | OUTPATIENT
Start: 2019-02-21 | End: 2019-02-21 | Stop reason: HOSPADM

## 2019-02-21 RX ORDER — OXYCODONE AND ACETAMINOPHEN 7.5; 325 MG/1; MG/1
2 TABLET ORAL EVERY 4 HOURS PRN
Status: DISCONTINUED | OUTPATIENT
Start: 2019-02-21 | End: 2019-02-21 | Stop reason: HOSPADM

## 2019-02-21 RX ORDER — CEFAZOLIN SODIUM 1 G/3ML
INJECTION, POWDER, FOR SOLUTION INTRAMUSCULAR; INTRAVENOUS AS NEEDED
Status: DISCONTINUED | OUTPATIENT
Start: 2019-02-21 | End: 2019-02-21 | Stop reason: SURG

## 2019-02-21 RX ORDER — ACETAMINOPHEN 500 MG
1000 TABLET ORAL ONCE
Status: COMPLETED | OUTPATIENT
Start: 2019-02-21 | End: 2019-02-21

## 2019-02-21 RX ORDER — MIDAZOLAM HYDROCHLORIDE 1 MG/ML
2 INJECTION INTRAMUSCULAR; INTRAVENOUS
Status: DISCONTINUED | OUTPATIENT
Start: 2019-02-21 | End: 2019-02-21 | Stop reason: HOSPADM

## 2019-02-21 RX ORDER — FENTANYL CITRATE 50 UG/ML
25 INJECTION, SOLUTION INTRAMUSCULAR; INTRAVENOUS
Status: DISCONTINUED | OUTPATIENT
Start: 2019-02-21 | End: 2019-02-21 | Stop reason: HOSPADM

## 2019-02-21 RX ORDER — HYDROCODONE BITARTRATE AND ACETAMINOPHEN 5; 325 MG/1; MG/1
1 TABLET ORAL ONCE AS NEEDED
Status: DISCONTINUED | OUTPATIENT
Start: 2019-02-21 | End: 2019-02-21 | Stop reason: HOSPADM

## 2019-02-21 RX ADMIN — GLYCOPYRROLATE 0.4 MG: 0.2 INJECTION, SOLUTION INTRAMUSCULAR; INTRAVENOUS at 08:31

## 2019-02-21 RX ADMIN — FENTANYL CITRATE 25 MCG: 50 INJECTION, SOLUTION INTRAMUSCULAR; INTRAVENOUS at 09:06

## 2019-02-21 RX ADMIN — MIDAZOLAM HYDROCHLORIDE 2 MG: 1 INJECTION, SOLUTION INTRAMUSCULAR; INTRAVENOUS at 06:52

## 2019-02-21 RX ADMIN — PROPOFOL 150 MG: 10 INJECTION, EMULSION INTRAVENOUS at 07:49

## 2019-02-21 RX ADMIN — FENTANYL CITRATE 50 MCG: 50 INJECTION, SOLUTION INTRAMUSCULAR; INTRAVENOUS at 06:53

## 2019-02-21 RX ADMIN — SUCCINYLCHOLINE CHLORIDE 100 MG: 20 INJECTION, SOLUTION INTRAMUSCULAR; INTRAVENOUS at 07:49

## 2019-02-21 RX ADMIN — FENTANYL CITRATE 50 MCG: 50 INJECTION, SOLUTION INTRAMUSCULAR; INTRAVENOUS at 06:51

## 2019-02-21 RX ADMIN — FAMOTIDINE 20 MG: 10 INJECTION, SOLUTION INTRAVENOUS at 06:46

## 2019-02-21 RX ADMIN — OXYCODONE HYDROCHLORIDE AND ACETAMINOPHEN 2 TABLET: 7.5; 325 TABLET ORAL at 09:06

## 2019-02-21 RX ADMIN — ROPIVACAINE HYDROCHLORIDE 20 ML: 5 INJECTION, SOLUTION EPIDURAL; INFILTRATION; PERINEURAL at 07:21

## 2019-02-21 RX ADMIN — ROCURONIUM BROMIDE 10 MG: 10 INJECTION INTRAVENOUS at 07:48

## 2019-02-21 RX ADMIN — DEXAMETHASONE SODIUM PHOSPHATE 4 MG: 4 INJECTION, SOLUTION INTRAMUSCULAR; INTRAVENOUS at 07:55

## 2019-02-21 RX ADMIN — FENTANYL CITRATE 25 MCG: 50 INJECTION, SOLUTION INTRAMUSCULAR; INTRAVENOUS at 09:05

## 2019-02-21 RX ADMIN — SODIUM CHLORIDE, POTASSIUM CHLORIDE, SODIUM LACTATE AND CALCIUM CHLORIDE 1000 ML: 600; 310; 30; 20 INJECTION, SOLUTION INTRAVENOUS at 05:50

## 2019-02-21 RX ADMIN — ROPIVACAINE HYDROCHLORIDE 20 ML: 2 INJECTION, SOLUTION EPIDURAL; INFILTRATION at 07:21

## 2019-02-21 RX ADMIN — ONDANSETRON HYDROCHLORIDE 4 MG: 2 SOLUTION INTRAMUSCULAR; INTRAVENOUS at 08:31

## 2019-02-21 RX ADMIN — CEFAZOLIN 1 G: 1 INJECTION, POWDER, FOR SOLUTION INTRAVENOUS at 07:53

## 2019-02-21 RX ADMIN — ONDANSETRON HYDROCHLORIDE 4 MG: 2 INJECTION INTRAMUSCULAR; INTRAVENOUS at 09:10

## 2019-02-21 RX ADMIN — FENTANYL CITRATE 100 MCG: 50 INJECTION, SOLUTION INTRAMUSCULAR; INTRAVENOUS at 07:47

## 2019-02-21 RX ADMIN — ACETAMINOPHEN 1000 MG: 500 TABLET, FILM COATED ORAL at 06:46

## 2019-02-21 RX ADMIN — Medication 3 MG: at 08:31

## 2019-02-21 NOTE — ANESTHESIA PREPROCEDURE EVALUATION
Anesthesia Evaluation     Patient summary reviewed and Nursing notes reviewed   no history of anesthetic complications:  NPO Solid Status: > 8 hours  NPO Liquid Status: > 8 hours           Airway   Mallampati: I  TM distance: >3 FB  Neck ROM: full  No difficulty expected  Dental      Pulmonary    (-) asthma, sleep apnea, not a smoker  Cardiovascular   Exercise tolerance: good (4-7 METS)    ECG reviewed    (+) hypertension,   (-) past MI, CAD      Neuro/Psych  (-) seizures, TIA, CVA  GI/Hepatic/Renal/Endo    (+)  GERD,    (-) liver disease, no renal disease, diabetes    Musculoskeletal     Abdominal    Substance History      OB/GYN          Other                        Anesthesia Plan    ASA 2     general with block     intravenous induction   Anesthetic plan, all risks, benefits, and alternatives have been provided, discussed and informed consent has been obtained with: patient.

## 2019-02-21 NOTE — ANESTHESIA POSTPROCEDURE EVALUATION
Patient: Cale Sebastian    Procedure Summary     Date:  02/21/19 Room / Location:   [facility] OR  /  [facility]    Anesthesia Start:  0743 Anesthesia Stop:  0849    Procedure:  INGUINAL HERNIA REPAIR WITH MESH (Left Abdomen) Diagnosis:  (HERNIA)    Surgeon:  Raven Meza MD Provider:  Simon Beckett CRNA    Anesthesia Type:  general with block ASA Status:  2          Anesthesia Type: general with block  Last vitals  BP   123/69 (02/21/19 0940)   Temp   98.1 °F (36.7 °C) (02/21/19 0930)   Pulse   60 (02/21/19 0940)   Resp   14 (02/21/19 0940)     SpO2   96 % (02/21/19 0940)     Post Anesthesia Care and Evaluation    PONV Status: none  Comments: Patient d/c from PACU prior to anes eval based on Kalyani score.  Please see RN notes for details of d/c criteria.    Blood pressure 123/69, pulse 60, temperature 98.1 °F (36.7 °C), temperature source Temporal, resp. rate 14, SpO2 96 %.

## 2019-02-21 NOTE — OP NOTE
Preoperative diagnosis:  Left inguinal hernia  Postoperative diagnosis:  Same  Procedure: open left inguinal hernia repair with placement of mesh  Surgeon:  Raven Meza MD  Anesthesia:  Get loc  Ebl:  Minimal  Ivf:  See anesthesia  Indications: the patient is a 58-year-old male who presents for inguinal hernia repair.  The risks, benefits, complications, and possible alternatives were discussed with the patient who agreed to proceed.  Description of procedure:  The patient was laid supine.  The abdomen was prepped and draped.  An incision was created parallel with the left inguinal ligament.  The subcutaneous tissues were dissected with bovie and bluntly.  The external oblique aponeurosis was opened.  The spermatic cord was encircled with penrose.  An indirect hernia sac was dissected from a lipoma of the cord. The sac was opened and closed with 2-0 silk in pursestring fashion.  The excess sac and lipoma was excised with bovie and discarded.   The stump of the sac was reduced into the deep ring.  An extralarge plug was used to maintain the reduction.  It was anchored in position with 0 prolene.  The flat portion of the mesh was anchored to the pubic tubercle, conjoint tendon, and inguinal ligament with 0 prolene.  The external oblique aponeurosis was closed with 3-0 vicyrl.  The gianni's fascia and deep dermis were closed with 3-0 vicyrl in multiple layers.  The skin was closed with 3-0 and 4-0 vicyrl.  The sponge, needle, and instrument counts were correct.  Complications: none  Disposition:  Good to pacu   findings:  Indirect hernia with lipoma of the cord

## 2019-02-21 NOTE — ANESTHESIA PROCEDURE NOTES
Airway  Urgency: elective    Difficult airway    General Information and Staff    Patient location during procedure: OR  CRNA: Simon Beckett CRNA    Indications and Patient Condition  Indications for airway management: airway protection    Preoxygenated: yes  Mask difficulty assessment: 2 - vent by mask + OA or adjuvant +/- NMBA    Final Airway Details  Final airway type: endotracheal airway      Successful airway: ETT  Cuffed: yes   Successful intubation technique: direct laryngoscopy  Facilitating devices/methods: intubating stylet  Endotracheal tube insertion site: oral  Blade: Mendez  Blade size: 2  ETT size (mm): 7.5  Cormack-Lehane Classification: grade IIb - view of arytenoids or posterior of glottis only  Placement verified by: capnometry   Cuff volume (mL): 6  Measured from: lips  ETT to lips (cm): 22  Number of attempts at approach: 1

## 2019-02-21 NOTE — DISCHARGE INSTRUCTIONS

## 2019-02-21 NOTE — ANESTHESIA PROCEDURE NOTES
Peripheral Block    Pre-sedation assessment completed: 2/21/2019 6:49 AM    Patient reassessed immediately prior to procedure    Patient location during procedure: pre-op  Start time: 2/21/2019 6:53 AM  Stop time: 2/21/2019 6:57 AM  Reason for block: procedure for pain, at surgeon's request, post-op pain management and Requested by Dr. Meza  Performed by  Anesthesiologist: Yelitza Meza MD  Preanesthetic Checklist  Completed: patient identified, site marked, surgical consent, pre-op evaluation, timeout performed, IV checked, risks and benefits discussed and monitors and equipment checked  Prep:  Pt Position: supine  Sterile barriers:gloves  Prep: ChloraPrep  Patient monitoring: blood pressure monitoring, continuous pulse oximetry and EKG  Procedure  Sedation:yes    Guidance:ultrasound guided and TAP identified and local anesthetic seen disecting layer between IOM and transverse abdominus  ULTRASOUND INTERPRETATION. Using ultrasound guidance a 20 G gauge needle was placed in close proximity to the nerve, at which point, under ultrasound guidance anesthetic was injected in the area of the nerve and spread of the anesthesia was seen on ultrasound in close proximity thereto.  There were no abnormalities seen on ultrasound; a digital image was taken; and the patient tolerated the procedure with no complications. Images:still images obtained (picture printed and placed in patients chart)    Laterality:left (Bilateral)  Block Type:ilioinguinal  Injection Technique:single-shot  Needle Type:echogenic  Needle Gauge:20 G    Medications Used: ropivacaine (NAROPIN) injection 0.5 %, 20 mL  ropivacaine (NAROPIN) 0.2% injection, 20 mL  Medications  Comment:0.35% Ropivacaine    Post Assessment  Injection Assessment: negative aspiration for heme, no paresthesia on injection and incremental injection  Patient Tolerance:comfortable throughout block  Complications:no

## 2020-12-01 ENCOUNTER — TELEPHONE (OUTPATIENT)
Dept: OTOLARYNGOLOGY | Facility: CLINIC | Age: 60
End: 2020-12-01

## 2020-12-01 NOTE — TELEPHONE ENCOUNTER
Attempting to reach patient to schedule with Dr Krishnamurthy for nevus on scalp. Received referral from [address] [place] [address]

## 2020-12-16 NOTE — PROGRESS NOTES
Mr. Sebastian is 60 y.o. male    CHIEF COMPLAINT: Peyronie's     HPI  Mr. Sebastian comes in today with concerns about his penis.  This been going on for about 6 months.  The onset of this was rather acute.  Context reveals no trauma or concern for penile fracture.  He does have some concern that this seemed to happen right after hernia repair.  The course of this is stable.  Severity is curvature of approximately 20 to 30 degrees superiorly into the left.    The following portions of the patient's history were reviewed and updated as appropriate: allergies, current medications, past family history, past medical history, past social history, past surgical history and problem list.      Review of Systems   Constitutional: Negative for appetite change and fever.   HENT: Negative for hearing loss and sore throat.    Eyes: Negative for pain and redness.   Respiratory: Negative for cough and shortness of breath.    Cardiovascular: Negative for chest pain and leg swelling.   Gastrointestinal: Negative for anal bleeding, nausea and vomiting.   Endocrine: Negative for cold intolerance and heat intolerance.   Genitourinary: Negative for dysuria, flank pain and hematuria.   Musculoskeletal: Negative for joint swelling and myalgias.   Skin: Negative for color change and rash.   Allergic/Immunologic: Negative for immunocompromised state.   Neurological: Negative for dizziness and speech difficulty.   Hematological: Negative for adenopathy. Does not bruise/bleed easily.   Psychiatric/Behavioral: Negative for dysphoric mood and suicidal ideas.         Current Outpatient Medications:   •  amLODIPine (NORVASC) 5 MG tablet, Take 5 mg by mouth daily., Disp: , Rfl:   •  aspirin 81 MG tablet, Take 81 mg by mouth daily., Disp: , Rfl:   •  Multiple Vitamins-Minerals (MENS MULTI VITAMIN & MINERAL) tablet, Take 1 tablet by mouth Daily., Disp: , Rfl:   •  Omega-3 Fatty Acids (FISH OIL BURP-LESS) 1200 MG capsule, Take  by mouth., Disp: , Rfl:  "  •  pantoprazole (PROTONIX) 40 MG EC tablet, Take 40 mg by mouth Daily. ONE TABLET DR TWO TIMES DAILY, #30, 30 DAYS STARTING 5/19/2016, REF X 10, MAIL ORDER #90 DAYS, NO REFILL , Disp: , Rfl:   •  HYDROcodone-acetaminophen (NORCO) 5-325 MG per tablet, Take 1-2 tablets by mouth Every 4 Hours As Needed for pain, Disp: 30 tablet, Rfl: 0    Past Medical History:   Diagnosis Date   • Cigarette nicotine dependence without complication    • Erectile dysfunction    • GERD (gastroesophageal reflux disease)    • Hearing loss    • Hypertension    • Inguinal hernia        Past Surgical History:   Procedure Laterality Date   • COLONOSCOPY  11/21/2012    Diverticulosis repeat exam in 5 years   • COLONOSCOPY  11/21/2012    Diverticulosis repeat exam in 5 years   • COLONOSCOPY N/A 11/7/2017    Procedure: COLONOSCOPY WITH ANESTHESIA;  Surgeon: Terrence Wood MD;  Location: [facility] ENDOSCOPY;  Service:    • ENDOSCOPY  05/24/2016    LA Grade B Esohagitis repeat exam in 3 months   • ENDOSCOPY  11/26/2012    SLANT, BX GEJ ESOPHAGITIS   • FOOT SURGERY Left     AS A CHILD   • HERNIA REPAIR      2ND GRADE   • INGUINAL HERNIA REPAIR Left 2/21/2019    Procedure: INGUINAL HERNIA REPAIR WITH MESH;  Surgeon: Raven Meza MD;  Location: [facility] OR;  Service: General       Social History     Socioeconomic History   • Marital status:      Spouse name: Not on file   • Number of children: Not on file   • Years of education: Not on file   • Highest education level: Not on file   Tobacco Use   • Smoking status: Former Smoker     Types: Electronic Cigarette   • Smokeless tobacco: Never Used   • Tobacco comment: quit 2 years ago   Substance and Sexual Activity   • Alcohol use: Yes     Comment: 1 beer a week   • Drug use: No   • Sexual activity: Defer       Family History   Problem Relation Age of Onset   • Stomach cancer Other    • Colon cancer Neg Hx    • Colon polyps Neg Hx          Temp 97.2 °F (36.2 °C)   Ht 182.9 cm (72\")   Wt 87.8 kg " (193 lb 9.6 oz)   BMI 26.26 kg/m²       Physical Exam  Constitutional: Well nourished, Well developed; No apparent distress.  His vital signs are reviewed  Psychiatric: Appropriate affect; Alert and oriented  Eyes: Unremarkable  Musculoskeletal: Normal gait and station  GI: Abdomen is soft, non-tender  Respiratory: No distress; Unlabored movement; No accessory musculature needed with symmetric movements  Skin: No pallor or diaphoresis  ; Penis is without noticeable plaque in the flaccid state.  There is also no curvature.  Testicles are palpably normal.        Data  Results for orders placed or performed in visit on 12/22/20   POC Urinalysis Dipstick, Multipro    Specimen: Urine   Result Value Ref Range    Color Yellow Yellow, Straw, Dark Yellow, Nathaly    Clarity, UA Clear Clear    Glucose, UA Negative Negative, 1000 mg/dL (3+) mg/dL    Bilirubin Negative Negative    Ketones, UA Negative Negative    Specific Gravity  1.020 1.005 - 1.030    Blood, UA Negative Negative    pH, Urine 7.0 5.0 - 8.0    Protein, POC Negative Negative mg/dL    Urobilinogen, UA Normal Normal    Nitrite, UA Negative Negative    Leukocytes Negative Negative         Assessment and Plan  Diagnoses and all orders for this visit:    1. Peyronie's disease (Primary)  -     POC Urinalysis Dipstick, Multipro    I recommend vitamin E at double the recommended FDA dosage or daily requirement.  We discussed collagenase but at this point his curvature does not seem severe enough.  We also discussed surgical procedures to straighten the penis as well as penile implants.  Again he does not appear severity would warrant surgical intervention at this point since he is sexually active to the point of satisfaction and without pain.    He will follow-up in 6 months.      (Please note that portions of this note were completed with a voice recognition program.)  Esteban Guerra MD  12/22/20  09:25 CST

## 2020-12-22 ENCOUNTER — OFFICE VISIT (OUTPATIENT)
Dept: UROLOGY | Facility: CLINIC | Age: 60
End: 2020-12-22

## 2020-12-22 VITALS — BODY MASS INDEX: 26.22 KG/M2 | HEIGHT: 72 IN | WEIGHT: 193.6 LBS | TEMPERATURE: 97.2 F

## 2020-12-22 DIAGNOSIS — N48.6 PEYRONIE'S DISEASE: Primary | ICD-10-CM

## 2020-12-22 LAB
BILIRUB BLD-MCNC: NEGATIVE MG/DL
CLARITY, POC: CLEAR
COLOR UR: YELLOW
GLUCOSE UR STRIP-MCNC: NEGATIVE MG/DL
KETONES UR QL: NEGATIVE
LEUKOCYTE EST, POC: NEGATIVE
NITRITE UR-MCNC: NEGATIVE MG/ML
PH UR: 7 [PH] (ref 5–8)
PROT UR STRIP-MCNC: NEGATIVE MG/DL
RBC # UR STRIP: NEGATIVE /UL
SP GR UR: 1.02 (ref 1–1.03)
UROBILINOGEN UR QL: NORMAL

## 2020-12-22 PROCEDURE — 81003 URINALYSIS AUTO W/O SCOPE: CPT | Performed by: UROLOGY

## 2020-12-22 PROCEDURE — 99203 OFFICE O/P NEW LOW 30 MIN: CPT | Performed by: UROLOGY

## 2021-01-20 ENCOUNTER — OFFICE VISIT (OUTPATIENT)
Dept: OTOLARYNGOLOGY | Facility: CLINIC | Age: 61
End: 2021-01-20

## 2021-01-20 VITALS
DIASTOLIC BLOOD PRESSURE: 81 MMHG | TEMPERATURE: 98 F | WEIGHT: 184 LBS | HEART RATE: 78 BPM | SYSTOLIC BLOOD PRESSURE: 145 MMHG | HEIGHT: 72 IN | RESPIRATION RATE: 20 BRPM | BODY MASS INDEX: 24.92 KG/M2

## 2021-01-20 DIAGNOSIS — D48.5 NEOPLASM OF UNCERTAIN BEHAVIOR OF SKIN: Primary | ICD-10-CM

## 2021-01-20 PROCEDURE — 99203 OFFICE O/P NEW LOW 30 MIN: CPT | Performed by: OTOLARYNGOLOGY

## 2021-01-20 PROCEDURE — 11103 TANGNTL BX SKIN EA SEP/ADDL: CPT | Performed by: OTOLARYNGOLOGY

## 2021-01-20 PROCEDURE — 88305 TISSUE EXAM BY PATHOLOGIST: CPT | Performed by: OTOLARYNGOLOGY

## 2021-01-20 PROCEDURE — 11104 PUNCH BX SKIN SINGLE LESION: CPT | Performed by: OTOLARYNGOLOGY

## 2021-01-20 PROCEDURE — 11105 PUNCH BX SKIN EA SEP/ADDL: CPT | Performed by: OTOLARYNGOLOGY

## 2021-01-20 NOTE — PROGRESS NOTES
PRIMARY CARE PROVIDER: Ganga Albarran DO  REFERRING PROVIDER: No ref. provider found    Chief Complaint   Patient presents with   • Skin Lesion     lesion of left forehead ,right neck and right forehead       Subjective   History of Present Illness:  Cale Sebastian is a  60 y.o. male who presents for surgical consultation regarding 3 lesions on the face.  The first is on the right postauricular skin.  This is been present for 2 to 3 years.  This is slightly growing in size.  This is mild in severity, and chronic in nature.      He reports a lesion of the right forehead that has been present for approximately 3 years.  This is unchanging, but is pigmented.  This is very mild.  This does not bother him at all.    He has a lesion of the left forehead that has been present for approximately 3 years.  This is unchanging, but is pigmented.  This is very mild in severity.  This does not bother him at all.    Review of Systems:  Review of Systems   Constitutional: Negative for chills, fatigue, fever and unexpected weight change.   HENT: Negative for facial swelling.    Respiratory: Negative for cough, chest tightness and shortness of breath.    Cardiovascular: Negative for chest pain.   Musculoskeletal: Negative for neck pain.   Skin: Positive for color change.   Neurological: Negative for facial asymmetry.   Hematological: Negative for adenopathy. Does not bruise/bleed easily.       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     Past Surgical History:   Procedure Laterality Date   • COLONOSCOPY  11/21/2012    Diverticulosis repeat exam in 5 years   • COLONOSCOPY  11/21/2012    Diverticulosis repeat exam in 5 years   • COLONOSCOPY N/A 11/7/2017    Procedure:  COLONOSCOPY WITH ANESTHESIA;  Surgeon: Terrence Wood MD;  Location:  [facility] ENDOSCOPY;  Service:    • ENDOSCOPY  05/24/2016    LA Grade B Esohagitis repeat exam in 3 months   • ENDOSCOPY  11/26/2012    SLANT, BX GEJ ESOPHAGITIS   • FOOT SURGERY Left     AS A CHILD   • HERNIA REPAIR      2ND GRADE   • INGUINAL HERNIA REPAIR Left 2/21/2019    Procedure: INGUINAL HERNIA REPAIR WITH MESH;  Surgeon: Raven Meza MD;  Location: [facility] OR;  Service: General     Family History   Problem Relation Age of Onset   • Stomach cancer Other    • Colon cancer Neg Hx    • Colon polyps Neg Hx      Social History     Tobacco Use   • Smoking status: Former Smoker     Types: Electronic Cigarette   • Smokeless tobacco: Never Used   • Tobacco comment: quit 2 years ago   Substance Use Topics   • Alcohol use: Yes     Comment: 1 beer a week   • Drug use: No     Allergies:  Patient has no known allergies.    Current Outpatient Medications:   •  amLODIPine (NORVASC) 5 MG tablet, Take 5 mg by mouth daily., Disp: , Rfl:   •  aspirin 81 MG tablet, Take 81 mg by mouth daily., Disp: , Rfl:   •  Multiple Vitamins-Minerals (MENS MULTI VITAMIN & MINERAL) tablet, Take 1 tablet by mouth Daily., Disp: , Rfl:   •  Omega-3 Fatty Acids (FISH OIL BURP-LESS) 1200 MG capsule, Take  by mouth., Disp: , Rfl:   •  pantoprazole (PROTONIX) 40 MG EC tablet, Take 40 mg by mouth Daily. ONE TABLET DR TWO TIMES DAILY, #30, 30 DAYS STARTING 5/19/2016, REF X 10, MAIL ORDER #90 DAYS, NO REFILL , Disp: , Rfl:     Objective     Vital Signs:  Temp:  [98 °F (36.7 °C)] 98 °F (36.7 °C)  Heart Rate:  [78] 78  Resp:  [20] 20  BP: (145)/(81) 145/81    Physical Exam:  Physical Exam  Vitals signs and nursing note reviewed.   Constitutional:       General: He is not in acute distress.     Appearance: He is well-developed. He is not diaphoretic.   HENT:      Head: Normocephalic and atraumatic.        Right Ear: External ear normal.      Left Ear: External ear normal.      Nose:  Nose normal.   Eyes:      General: No scleral icterus.        Right eye: No discharge.         Left eye: No discharge.      Conjunctiva/sclera: Conjunctivae normal.      Pupils: Pupils are equal, round, and reactive to light.   Neck:      Musculoskeletal: Normal range of motion and neck supple.      Thyroid: No thyromegaly.      Vascular: No JVD.      Trachea: No tracheal deviation.   Pulmonary:      Effort: Pulmonary effort is normal.      Breath sounds: No stridor.   Musculoskeletal: Normal range of motion.         General: No deformity.   Lymphadenopathy:      Cervical: No cervical adenopathy.   Skin:     General: Skin is warm and dry.      Coloration: Skin is not pale.      Findings: No erythema or rash.   Neurological:      Mental Status: He is alert and oriented to person, place, and time.      Cranial Nerves: No cranial nerve deficit.      Coordination: Coordination normal.   Psychiatric:         Speech: Speech normal.         Behavior: Behavior normal. Behavior is cooperative.         Thought Content: Thought content normal.         Judgment: Judgment normal.       Assessment   Assessment:  1. Neoplasm of uncertain behavior of skin        Plan   Plan:    I discussed these lesions.  The right postauricular wound is concerning for a seborrheic keratosis.  The forehead lesions do appear like pigmented nevi.  Forehead lesions are not bothering him, the right postauricular lesion is somewhat irritated, and bothering him.  Discussed with him excisional biopsy versus punch biopsies of the forehead lesions to assess for any dysplasia or melanoma.  He is interested in proceeding with that.  I also discussed with him shave biopsy of the right postauricular area.  We will call him in approximate 1/2 weeks with the pathology results.    BIOPSY: The risks, benefits, and alternatives of the procedure including but not limited to pain, scarring, bleeding, infection, persistent symptoms, and risks of the anesthesia were  discussed full with the patient. Need for further therapy, depending on the pathologic outcome, was discussed. Questions were answered. No guarantees were made or implied.      Discussion of skin lesion. Discussed risks, benefits, alternatives, and possible complications of excision of the skin lesion with reconstruction utilizing local tissue rearrangement, full-thickness skin grafting, or local interpolated flaps. Risks include, but are not limited too: bleeding, infection, hematoma, recurrence, need for additional procedures, flap failure, cosmetic deformity. Patient understands risks and would like to proceed with surgery.     My findings and recommendations were discussed and questions were answered.     Zaheer Krishnamurthy MD  01/20/21  13:28 CST

## 2021-01-20 NOTE — PROGRESS NOTES
Call made to Maria C, advised have not been able to discuss with Dr. Wagner yet. He is currently in procedures but will review with him as soon as possible. She verbalized understanding, appreciated the update and denied further questions.    Preprocedure diagnosis: Dysplastic nevus versus melanoma of the right forehead    Post procedure diagnosis: Same    Procedure: Punch biopsy    Details:  Patient consent was obtained.  The skin was cleansed with alcohol.  The skin was infiltrated with 1 mL of 1% lidocaine with 1-100,000 epinephrine.  After approximately 10 minutes, a 3 millimeter punch biopsy was taken by placing circular motion on the biopsy tool, the skin was elevated and clipped with iris scissors.  The specimen was sent in formalin.  The skin was closed using a simple 5-0 fast absorbing gut suture.  Antibiotic ointment was placed over the biopsy site.  The patient tolerated the procedure with minimal discomfort.    Zaheer Krishnamurthy MD  01/20/21  13:31 CST    Preprocedure diagnosis: Dysplastic nevus versus melanoma of the left forehead    Post procedure diagnosis: Same    Procedure: Punch biopsy    Details:  Patient consent was obtained.  The skin was cleansed with alcohol.  The skin was infiltrated with 1 mL of 1% lidocaine with 1-100,000 epinephrine.  After approximately 10 minutes, a 3 millimeter punch biopsy was taken by placing circular motion on the biopsy tool, the skin was elevated and clipped with iris scissors.  The specimen was sent in formalin.  The skin was closed using a simple 5-0 fast absorbing gut suture.  Antibiotic ointment was placed over the biopsy site.  The patient tolerated the procedure with minimal discomfort.    Zaheer Krishnamurthy MD  01/20/21  13:31 CST    Preprocedure diagnosis: Inflamed seborrheic keratosis of the right postauricular skin    Post procedure diagnosis: Same    Procedure: Shave biopsy    Details:  Patient consent was obtained.  The skin was cleansed with alcohol.  The skin was infiltrated with 1 mL of 1% lidocaine with 1-100,000 epinephrine.  After approximately 10 minutes, a Dermablade was used to perform the shave biopsy. The specimen was lifted off the defect and sent in formalin for permanent section  pathology. Pressure was applied to the wound. Antibiotic ointment was placed over the biopsy site.  The patient tolerated the procedure with minimal discomfort.      Zaheer Krishnamurthy MD  01/20/21  13:31 CST

## 2021-01-20 NOTE — PATIENT INSTRUCTIONS
CONTACT INFORMATION:  The main office phone number is [phone]. For emergencies after hours and on weekends, this number will convert over to our answering service and the on call provider will answer. Please try to keep non emergent phone calls/ questions to office hours 9am-5pm Monday through Friday.     Five Below  As an alternative, you can sign up and use the Epic MyChart system for more direct and quicker access for non emergent questions/ problems.  [facility] Five Below allows you to send messages to your doctor, view your test results, renew your prescriptions, schedule appointments, and more. To sign up, go to [employer] and click on the Sign Up Now link in the New User? box. Enter your Five Below Activation Code exactly as it appears below along with the last four digits of your Social Security Number and your Date of Birth () to complete the sign-up process. If you do not sign up before the expiration date, you must request a new code.    Five Below Activation Code: Activation code not generated  Current Five Below Status: Patient Declined    If you have questions, you can email [email or web address] or call [phone] to talk to our Five Below staff. Remember, Five Below is NOT to be used for urgent needs. For medical emergencies, dial 911.

## 2021-01-22 LAB
LAB AP CASE REPORT: NORMAL
LAB AP CLINICAL INFORMATION: NORMAL
PATH REPORT.FINAL DX SPEC: NORMAL
PATH REPORT.GROSS SPEC: NORMAL

## 2021-02-16 ENCOUNTER — TELEPHONE (OUTPATIENT)
Dept: OTOLARYNGOLOGY | Facility: CLINIC | Age: 61
End: 2021-02-16

## 2021-03-16 ENCOUNTER — TELEPHONE (OUTPATIENT)
Dept: OTOLARYNGOLOGY | Facility: CLINIC | Age: 61
End: 2021-03-16

## 2021-03-16 NOTE — TELEPHONE ENCOUNTER
I called and spoke to his wife.  I gave her the pathology results, pigmented seborrheic keratoses.  If these return, call for follow-up.    .Electronically signed by Zaheer Krishnamurthy MD, 03/16/21, 4:08 PM CDT.

## 2021-03-27 ENCOUNTER — IMMUNIZATION (OUTPATIENT)
Dept: VACCINE CLINIC | Facility: HOSPITAL | Age: 61
End: 2021-03-27

## 2021-03-27 PROCEDURE — 0011A: CPT | Performed by: OBSTETRICS & GYNECOLOGY

## 2021-03-27 PROCEDURE — 91301 HC SARSCO02 VAC 100MCG/0.5ML IM: CPT | Performed by: OBSTETRICS & GYNECOLOGY

## 2021-04-24 ENCOUNTER — IMMUNIZATION (OUTPATIENT)
Dept: VACCINE CLINIC | Facility: HOSPITAL | Age: 61
End: 2021-04-24

## 2021-04-24 PROCEDURE — 91301 HC SARSCO02 VAC 100MCG/0.5ML IM: CPT | Performed by: OBSTETRICS & GYNECOLOGY

## 2021-04-24 PROCEDURE — 0012A: CPT | Performed by: OBSTETRICS & GYNECOLOGY

## 2022-06-07 ENCOUNTER — TRANSCRIBE ORDERS (OUTPATIENT)
Dept: ADMINISTRATIVE | Facility: HOSPITAL | Age: 62
End: 2022-06-07

## 2022-06-07 DIAGNOSIS — Z12.2 SCREENING FOR LUNG CANCER: ICD-10-CM

## 2022-06-07 DIAGNOSIS — Z87.891 FORMER SMOKER: Primary | ICD-10-CM

## 2022-06-20 ENCOUNTER — HOSPITAL ENCOUNTER (OUTPATIENT)
Dept: CT IMAGING | Facility: HOSPITAL | Age: 62
Discharge: HOME | End: 2022-06-20
Admitting: INTERNAL MEDICINE

## 2022-06-20 DIAGNOSIS — Z12.2 SCREENING FOR LUNG CANCER: ICD-10-CM

## 2022-06-20 DIAGNOSIS — Z87.891 FORMER SMOKER: ICD-10-CM

## 2022-06-20 PROCEDURE — 71271 CT THORAX LUNG CANCER SCR C-: CPT

## 2022-06-27 ENCOUNTER — DOCUMENTATION (OUTPATIENT)
Dept: CT IMAGING | Facility: HOSPITAL | Age: 62
End: 2022-06-27

## 2023-04-18 ENCOUNTER — OFFICE VISIT (OUTPATIENT)
Dept: OTOLARYNGOLOGY | Facility: CLINIC | Age: 63
End: 2023-04-18
Payer: COMMERCIAL

## 2023-04-18 VITALS
HEART RATE: 98 BPM | DIASTOLIC BLOOD PRESSURE: 89 MMHG | BODY MASS INDEX: 26.95 KG/M2 | WEIGHT: 199 LBS | TEMPERATURE: 98 F | RESPIRATION RATE: 16 BRPM | SYSTOLIC BLOOD PRESSURE: 137 MMHG | HEIGHT: 72 IN

## 2023-04-18 DIAGNOSIS — H72.91 PERFORATION OF RIGHT TYMPANIC MEMBRANE: ICD-10-CM

## 2023-04-18 DIAGNOSIS — Z98.890 HISTORY OF MYRINGOTOMY: ICD-10-CM

## 2023-04-18 DIAGNOSIS — H91.8X9 ASYMMETRICAL HEARING LOSS: Primary | ICD-10-CM

## 2023-04-18 RX ORDER — AZELASTINE 1 MG/ML
2 SPRAY, METERED NASAL 2 TIMES DAILY
Qty: 30 ML | Refills: 11 | Status: SHIPPED | OUTPATIENT
Start: 2023-04-18

## 2023-04-18 NOTE — PROGRESS NOTES
YOB: 1960  Location: [facility]  Location Address: [address] [place] [address], [facility], [address] [place] [address]  Location Phone: [phone]    Chief Complaint   Patient presents with   • Hearing Loss     [facility]       History of Present Illness  Cale Sebastian is a 63 y.o. male.  Cale Sebastian is here for evaluation of ENT complaints. The patient has had problems with hearing loss. The symptoms are localized to both ears. The patient has had mild to moderate symptoms. The symptoms have been present for the last several years. There have been no identified factors that aggravate the symptoms. There have been no factors that have improved the symptoms.    He recently had a hearing test 3/2023 with the [facility].  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 Over the last two years, he felt that his hearing has worsened.     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   • Tinnitus        Past Surgical History:   Procedure Laterality Date   • COLONOSCOPY  2012    Diverticulosis repeat exam in 5 years   • COLONOSCOPY  2012    Diverticulosis repeat exam in 5 years   • COLONOSCOPY N/A 2017    Procedure: COLONOSCOPY WITH ANESTHESIA;  Surgeon: Terrence Wood MD;  Location: [facility] ENDOSCOPY;  Service:    • ENDOSCOPY  2016    LA Grade B Esohagitis repeat exam in 3 months   • ENDOSCOPY  2012    SLANT, BX GEJ ESOPHAGITIS   • FOOT SURGERY Left     AS A CHILD   • HERNIA REPAIR      2ND GRADE   • INGUINAL HERNIA REPAIR Left 2019    Procedure: INGUINAL HERNIA REPAIR WITH MESH;  Surgeon: Raven Meza MD;   Location: [facility] OR;  Service: General       Outpatient Medications Marked as Taking for the 4/18/23 encounter (Office Visit) with Vlad Srivastava APRN   Medication Sig Dispense Refill   • amLODIPine-benazepril (LOTREL) 10-20 MG per capsule Take 1 capsule by mouth Daily. 90 capsule 1   • aspirin 81 MG tablet Take 1 tablet by mouth Daily.     • Coenzyme Q-10 30 MG capsule Take 30 mg by mouth Daily. 30 capsule 1   • Multiple Vitamins-Minerals (MENS MULTI VITAMIN & MINERAL) tablet Take 1 tablet by mouth Daily.     • Omega-3 Fatty Acids (FISH OIL BURP-LESS) 1200 MG capsule Take  by mouth.     • pantoprazole (PROTONIX) 40 MG EC tablet Take 1 tablet by mouth Daily. 90 tablet 1   • pravastatin (PRAVACHOL) 40 MG tablet Take 1 tablet by mouth Daily--Replaces Rosuvastatin (Crestor) 30 tablet 2       Patient has no known allergies.    Family History   Problem Relation Age of Onset   • Stomach cancer Other    • Hypertension Father    • Colon cancer Neg Hx    • Colon polyps Neg Hx        Social History     Socioeconomic History   • Marital status:    Tobacco Use   • Smoking status: Former     Types: Cigarettes   • Smokeless tobacco: Never   • Tobacco comments:     Former cigarette smoker   Vaping Use   • Vaping Use: Every day   • Substances: Nicotine   Substance and Sexual Activity   • Alcohol use: Not Currently     Comment: 1 beer a week   • Drug use: Never   • Sexual activity: Defer       Review of Systems   Constitutional: Negative.    HENT: Positive for hearing loss. Negative for ear pain.    Respiratory: Negative.    Cardiovascular: Negative.    Gastrointestinal: Negative.    Genitourinary: Negative.    Neurological: Negative.        Vitals:    04/18/23 1457   BP: 137/89   Pulse: 98   Resp: 16   Temp: 98 °F (36.7 °C)       Body mass index is 26.99 kg/m².    Objective     Physical Exam  Vitals reviewed.   Constitutional:       Appearance: Normal appearance. He is normal weight.   HENT:      Head: Normocephalic and  atraumatic.      Right Ear: Tympanic membrane is perforated.      Left Ear: Tympanic membrane is scarred.      Ears:      Comments: Bilateral hearing aids noted  Large central dry perforation noted     Nose: Nose normal.      Mouth/Throat:      Lips: Pink.      Mouth: Mucous membranes are moist.      Pharynx: Oropharynx is clear. Uvula midline.   Musculoskeletal:      Cervical back: Full passive range of motion without pain.   Neurological:      Mental Status: He is alert.   Psychiatric:         Behavior: Behavior is cooperative.         Assessment & Plan   Diagnoses and all orders for this visit:    1. Asymmetrical hearing loss (Primary)  -     CT Temporal Bones With & Without Contrast    2. Perforation of right tympanic membrane  -     CT Temporal Bones With & Without Contrast    3. History of myringotomy    Other orders  -     azelastine (ASTELIN) 0.1 % nasal spray; Instill 2 sprays into the nostril(s) as directed by provider 2 (Two) Times a Day.  Dispense: 30 mL; Refill: 11      * Surgery not found *  Orders Placed This Encounter   Procedures   • CT Temporal Bones With & Without Contrast     Will obtain CT temporal bones  Nasal sprays as directed  Consider tympanoplasty vs myringoplasty  Return for problems    Return in about 4 weeks (around 5/16/2023) for CT.       Patient Instructions   Will obtain CT temporal bones  Nasal sprays as directed  Consider tympanoplasty vs myringoplasty  Return for problems    For the best response, use your nasal sprays every day without skipping doses. It may take several weeks before the full effect is acheived.      CONTACT INFORMATION:  The main office phone number is [phone]. For emergencies after hours and on weekends, this number will convert over to our answering service and the on call provider will answer. Please try to keep non emergent phone calls/ questions to office hours 9am-5pm Monday through Friday.      [email or web address]  As an alternative, you can sign up and use the  Epic MyChart system for more direct and quicker access for non emergent questions/ problems.  [employer] allows you to send messages to your doctor, view your test results, renew your prescriptions, schedule appointments, and more. To sign up, go to Real[employer]ity and click on the Sign Up Now link in the New User? box. Enter your Fanvibe Activation Code exactly as it appears below along with the last four digits of your Social Security Number and your Date of Birth () to complete the sign-up process. If you do not sign up before the expiration date, you must request a new code.     Fanvibe Activation Code: Activation code not generated  Current Fanvibe Status: Active     If you have questions, you can email [email or web address] 517 [email or web address] or call [phone] to talk to our Fanvibe staff. Remember, Fanvibe is NOT to be used for urgent needs. For medical emergencies, dial 911.     IF YOU SMOKE OR USE TOBACCO PLEASE READ THE FOLLOWING:  Why is smoking bad for me?  Smoking increases the risk of heart disease, lung disease, vascular disease, stroke, and cancer. If you smoke, STOP!        IF YOU SMOKE OR USE TOBACCO PLEASE READ THE FOLLOWING:  Why is smoking bad for me?  Smoking increases the risk of heart disease, lung disease, vascular disease, stroke, and cancer. If you smoke, STOP!     For more information:  Quit Now [facility]  -QUIT-NOW  [email or web address]

## 2023-04-18 NOTE — PATIENT INSTRUCTIONS
Will obtain CT temporal bones  Nasal sprays as directed  Consider tympanoplasty vs myringoplasty  Return for problems    For the best response, use your nasal sprays every day without skipping doses. It may take several weeks before the full effect is acheived.      CONTACT INFORMATION:  The main office phone number is [phone]. For emergencies after hours and on weekends, this number will convert over to our answering service and the on call provider will answer. Please try to keep non emergent phone calls/ questions to office hours 9am-5pm Monday through Friday.      Route4Me  As an alternative, you can sign up and use the Epic MyChart system for more direct and quicker access for non emergent questions/ problems.  [employer] allows you to send messages to your doctor, view your test results, renew your prescriptions, schedule appointments, and more. To sign up, go to [employer] and click on the Sign Up Now link in the New User? box. Enter your Route4Me Activation Code exactly as it appears below along with the last four digits of your Social Security Number and your Date of Birth () to complete the sign-up process. If you do not sign up before the expiration date, you must request a new code.     Route4Me Activation Code: Activation code not generated  Current Route4Me Status: Active     If you have questions, you can email [email or web address] or call [phone] to talk to our Route4Me staff. Remember, Route4Me is NOT to be used for urgent needs. For medical emergencies, dial 911.     IF YOU SMOKE OR USE TOBACCO PLEASE READ THE FOLLOWING:  Why is smoking bad for me?  Smoking increases the risk of heart disease, lung disease, vascular disease, stroke, and cancer. If you smoke, STOP!        IF YOU SMOKE OR USE TOBACCO PLEASE READ THE FOLLOWING:  Why is smoking bad for me?  Smoking increases the risk of heart disease, lung disease, vascular disease, stroke, and cancer. If you  smoke, STOP!     For more information:  [employer]uit Now [place]  1-800-QUIT-NOW  https://[place].quitlogix.org/en-[place]/

## 2023-04-19 ENCOUNTER — IMMUNIZATION (OUTPATIENT)
Dept: FAMILY MEDICINE CLINIC | Facility: CLINIC | Age: 63
End: 2023-04-19
Payer: COMMERCIAL

## 2023-04-19 DIAGNOSIS — Z23 NEED FOR FIRST BOOSTER DOSE OF COVID-19 VACCINE: Primary | ICD-10-CM

## 2023-04-19 NOTE — PROGRESS NOTES
Pt presents for covid bivalent booster   Allergies reviewed   Consent obtained   LD  Pt asked to wait 15 min after administration   VSAFE info sheet given to pt   Pt tolerated

## 2023-05-02 ENCOUNTER — HOSPITAL ENCOUNTER (OUTPATIENT)
Dept: CT IMAGING | Facility: HOSPITAL | Age: 63
Discharge: HOME | End: 2023-05-02
Admitting: NURSE PRACTITIONER
Payer: COMMERCIAL

## 2023-05-02 DIAGNOSIS — R93.89 ABNORMAL CT SCAN: Primary | ICD-10-CM

## 2023-05-02 LAB — CREAT BLDA-MCNC: 1.1 MG/DL (ref 0.6–1.3)

## 2023-05-02 PROCEDURE — 82565 ASSAY OF CREATININE: CPT

## 2023-05-02 PROCEDURE — 70482 CT ORBIT/EAR/FOSSA W/O&W/DYE: CPT

## 2023-05-02 PROCEDURE — 25510000001 IOPAMIDOL 61 % SOLUTION: Performed by: NURSE PRACTITIONER

## 2023-05-02 RX ADMIN — IOPAMIDOL 100 ML: 612 INJECTION, SOLUTION INTRAVENOUS at 14:25

## 2023-05-08 ENCOUNTER — TELEPHONE (OUTPATIENT)
Dept: OTOLARYNGOLOGY | Facility: CLINIC | Age: 63
End: 2023-05-08
Payer: COMMERCIAL

## 2023-05-08 NOTE — TELEPHONE ENCOUNTER
Left voicemail notifying patient after several failed attempts at contacting patient. Phone rings once and goes straight to voicemail.

## 2023-05-08 NOTE — TELEPHONE ENCOUNTER
----- Message from JAVIER Ma sent at 5/2/2023  5:16 PM CDT -----  I am sending a referral to neurosurgery to further evaluate findings on CT.   As far as the ears go, we will further evaluate at follow up with Dr. Phan. CT shows trace mastoid effusions. Recommend nasal sprays until follow up

## 2023-05-16 DIAGNOSIS — H91.8X9 ASYMMETRICAL HEARING LOSS: Primary | ICD-10-CM

## 2023-05-16 DIAGNOSIS — R93.89 ABNORMAL CT SCAN: ICD-10-CM

## 2023-06-02 ENCOUNTER — HOSPITAL ENCOUNTER (OUTPATIENT)
Dept: CT IMAGING | Facility: HOSPITAL | Age: 63
Discharge: HOME | End: 2023-06-02

## 2023-06-02 DIAGNOSIS — R93.89 ABNORMAL CT SCAN: ICD-10-CM

## 2023-06-02 LAB — CREAT BLDA-MCNC: 1.1 MG/DL (ref 0.6–1.3)

## 2023-06-02 PROCEDURE — 82565 ASSAY OF CREATININE: CPT

## 2023-06-02 PROCEDURE — 25510000001 IOPAMIDOL PER 1 ML: Performed by: NURSE PRACTITIONER

## 2023-06-02 PROCEDURE — 70496 CT ANGIOGRAPHY HEAD: CPT

## 2023-06-02 RX ADMIN — IOPAMIDOL 100 ML: 755 INJECTION, SOLUTION INTRAVENOUS at 08:42

## 2023-06-08 ENCOUNTER — TELEPHONE (OUTPATIENT)
Dept: NEUROSURGERY | Facility: CLINIC | Age: 63
End: 2023-06-08
Payer: COMMERCIAL

## 2023-06-08 ENCOUNTER — OFFICE VISIT (OUTPATIENT)
Dept: NEUROSURGERY | Facility: CLINIC | Age: 63
End: 2023-06-08
Payer: COMMERCIAL

## 2023-06-08 VITALS
DIASTOLIC BLOOD PRESSURE: 78 MMHG | SYSTOLIC BLOOD PRESSURE: 128 MMHG | WEIGHT: 203 LBS | HEIGHT: 72 IN | BODY MASS INDEX: 27.5 KG/M2

## 2023-06-08 DIAGNOSIS — E66.3 OVERWEIGHT WITH BODY MASS INDEX (BMI) OF 27 TO 27.9 IN ADULT: ICD-10-CM

## 2023-06-08 DIAGNOSIS — Z72.0 VAPES NICOTINE CONTAINING SUBSTANCE: ICD-10-CM

## 2023-06-08 DIAGNOSIS — H90.5 SENSORINEURAL HEARING LOSS (SNHL) OF LEFT EAR, UNSPECIFIED HEARING STATUS ON CONTRALATERAL SIDE: ICD-10-CM

## 2023-06-08 DIAGNOSIS — H90.71 MIXED CONDUCTIVE AND SENSORINEURAL HEARING LOSS OF RIGHT EAR, UNSPECIFIED HEARING STATUS ON CONTRALATERAL SIDE: ICD-10-CM

## 2023-06-08 DIAGNOSIS — Z87.891 FORMER SMOKER: ICD-10-CM

## 2023-06-08 DIAGNOSIS — I67.1 DURAL ARTERIOVENOUS FISTULA: Primary | ICD-10-CM

## 2023-06-08 PROBLEM — Q27.30 ARTERIOVENOUS MALFORMATION (AVM): Status: ACTIVE | Noted: 2023-06-08

## 2023-06-08 PROCEDURE — 99204 OFFICE O/P NEW MOD 45 MIN: CPT | Performed by: NEUROLOGICAL SURGERY

## 2023-06-08 NOTE — TELEPHONE ENCOUNTER
LEFT MESSAGE FOR PT ASKING IF HE CAN COME IN AT 1PM FOR PAPERWORK AND 1:30 APPT. PROVIDER HAS EMERGENCY SURGERY ADD ON.     ALSO SENT AN [employer] TEXT.

## 2023-06-08 NOTE — LETTER
June 8, 2023       No Recipients    Patient: Cale Sebastian   YOB: 1960   Date of Visit: 6/8/2023       Dear Ganga Albarran,     Cale Sebastian was in my office today. Below is a copy of my note.    If you have questions, please do not hesitate to call me. I look forward to following Cale along with you.         Sincerely,        Tej Mccann MD        CC:   No Recipients    No notes on file

## 2023-06-08 NOTE — PATIENT INSTRUCTIONS
"PATIENT TO CONTINUE TO FOLLOW UP WITH HIS PRIMARY CARE PROVIDER FOR YEARLY PHYSICAL EXAMS TO ENSURE COMPLETE HEALTH MAINTENANCE      BMI for Adults  What is BMI?  Body mass index (BMI) is a number that is calculated from a person's weight and height. BMI can help estimate how much of a person's weight is composed of fat. BMI does not measure body fat directly. Rather, it is an alternative to procedures that directly measure body fat, which can be difficult and expensive.  BMI can help identify people who may be at higher risk for certain medical problems.  What are BMI measurements used for?  BMI is used as a screening tool to identify possible weight problems. It helps determine whether a person is obese, overweight, a healthy weight, or underweight.  BMI is useful for:  Identifying a weight problem that may be related to a medical condition or may increase the risk for medical problems.  Promoting changes, such as changes in diet and exercise, to help reach a healthy weight. BMI screening can be repeated to see if these changes are working.  How is BMI calculated?  BMI involves measuring your weight in relation to your height. Both height and weight are measured, and the BMI is calculated from those numbers. This can be done either in English ([place] or metric measurements. Note that charts and online BMI calculators are available to help you find your BMI quickly and easily without having to do these calculations yourself.  To calculate your BMI in English ([place] measurements:    Measure your weight in pounds (lb).  Multiply the number of pounds by 703.  For example, for a person who weighs 180 lb, multiply that number by 703, which equals 126,540.  Measure your height in inches. Then multiply that number by itself to get a measurement called \"inches squared.\"  For example, for a person who is 70 inches tall, the \"inches squared\" measurement is 70 inches x 70 inches, which equals 4,900 inches squared.  Divide the " "total from step 2 (number of lb x 703) by the total from step 3 (inches squared): 126,540 ÷ 4,900 = 25.8. This is your BMI.  To calculate your BMI in metric measurements:  Measure your weight in kilograms (kg).  Measure your height in meters (m). Then multiply that number by itself to get a measurement called \"meters squared.\"  For example, for a person who is 1.75 m tall, the \"meters squared\" measurement is 1.75 m x 1.75 m, which is equal to 3.1 meters squared.  Divide the number of kilograms (your weight) by the meters squared number. In this example: 70 ÷ 3.1 = 22.6. This is your BMI.  What do the results mean?  BMI charts are used to identify whether you are underweight, normal weight, overweight, or obese. The following guidelines will be used:  Underweight: BMI less than 18.5.  Normal weight: BMI between 18.5 and 24.9.  Overweight: BMI between 25 and 29.9.  Obese: BMI of 30 or above.  Keep these notes in mind:  Weight includes both fat and muscle, so someone with a muscular build, such as an athlete, may have a BMI that is higher than 24.9. In cases like these, BMI is not an accurate measure of body fat.  To determine if excess body fat is the cause of a BMI of 25 or higher, further assessments may need to be done by a health care provider.  BMI is usually interpreted in the same way for men and women.  Where to find more information  For more information about BMI, including tools to quickly calculate your BMI, go to these websites:  Centers for Disease Control and Prevention: www.cdc.gov  American Heart Association: www.heart.org  National Heart, Lung, and Blood [place]: www.nhlbi.nih.gov  Summary  Body mass index (BMI) is a number that is calculated from a person's weight and height.  BMI may help estimate how much of a person's weight is composed of fat. BMI can help identify those who may be at higher risk for certain medical problems.  BMI can be measured using English measurements or metric " "measurements.  BMI charts are used to identify whether you are underweight, normal weight, overweight, or obese.  This information is not intended to replace advice given to you by your health care provider. Make sure you discuss any questions you have with your health care provider.  Document Revised: 09/09/2020 Document Reviewed: 07/17/2020  [employer] Patient Education © 2022 [employer]  DASH Eating Plan  DASH stands for Dietary Approaches to Stop Hypertension. The DASH eating plan is a healthy eating plan that has been shown to:  Reduce high blood pressure (hypertension).  Reduce your risk for type 2 diabetes, heart disease, and stroke.  Help with weight loss.  What are tips for following this plan?  Reading food labels  Check food labels for the amount of salt (sodium) per serving. Choose foods with less than 5 percent of the Daily Value of sodium. Generally, foods with less than 300 milligrams (mg) of sodium per serving fit into this eating plan.  To find whole grains, look for the word \"whole\" as the first word in the ingredient list.  Shopping  Buy products labeled as \"low-sodium\" or \"no salt added.\"  Buy fresh foods. Avoid canned foods and pre-made or frozen meals.  Cooking  Avoid adding salt when cooking. Use salt-free seasonings or herbs instead of table salt or sea salt. Check with your health care provider or pharmacist before using salt substitutes.  Do not rayo foods. Cook foods using healthy methods such as baking, boiling, grilling, roasting, and broiling instead.  Cook with heart-healthy oils, such as olive, canola, avocado, soybean, or sunflower oil.  Meal planning    Eat a balanced diet that includes:  4 or more servings of fruits and 4 or more servings of vegetables each day. Try to fill one-half of your plate with fruits and vegetables.  6-8 servings of whole grains each day.  Less than 6 oz (170 g) of lean meat, poultry, or fish each day. A 3-oz (85-g) serving of meat is about the same size as " a deck of cards. One egg equals 1 oz (28 g).  2-3 servings of low-fat dairy each day. One serving is 1 cup (237 mL).  1 serving of nuts, seeds, or beans 5 times each week.  2-3 servings of heart-healthy fats. Healthy fats called omega-3 fatty acids are found in foods such as walnuts, flaxseeds, fortified milks, and eggs. These fats are also found in cold-water fish, such as sardines, salmon, and mackerel.  Limit how much you eat of:  Canned or prepackaged foods.  Food that is high in trans fat, such as some fried foods.  Food that is high in saturated fat, such as fatty meat.  Desserts and other sweets, sugary drinks, and other foods with added sugar.  Full-fat dairy products.  Do not salt foods before eating.  Do not eat more than 4 egg yolks a week.  Try to eat at least 2 vegetarian meals a week.  Eat more home-cooked food and less restaurant, buffet, and fast food.  Lifestyle  When eating at a restaurant, ask that your food be prepared with less salt or no salt, if possible.  If you drink alcohol:  Limit how much you use to:  0-1 drink a day for women who are not pregnant.  0-2 drinks a day for men.  Be aware of how much alcohol is in your drink. In the [place] one drink equals one 12 oz bottle of beer (355 mL), one 5 oz glass of wine (148 mL), or one 1½ oz glass of hard liquor (44 mL).  General information  Avoid eating more than 2,300 mg of salt a day. If you have hypertension, you may need to reduce your sodium intake to 1,500 mg a day.  Work with your health care provider to maintain a healthy body weight or to lose weight. Ask what an ideal weight is for you.  Get at least 30 minutes of exercise that causes your heart to beat faster (aerobic exercise) most days of the week. Activities may include walking, swimming, or biking.  Work with your health care provider or dietitian to adjust your eating plan to your individual calorie needs.  What foods should I eat?  Fruits  All fresh, dried, or frozen fruit. Canned  fruit in natural juice (without added sugar).  Vegetables  Fresh or frozen vegetables (raw, steamed, roasted, or grilled). Low-sodium or reduced-sodium tomato and vegetable juice. Low-sodium or reduced-sodium tomato sauce and tomato paste. Low-sodium or reduced-sodium canned vegetables.  Grains  Whole-grain or whole-wheat bread. Whole-grain or whole-wheat pasta. Brown rice. Oatmeal. Quinoa. Bulgur. Whole-grain and low-sodium cereals. Kay bread. Low-fat, low-sodium crackers. Whole-wheat flour tortillas.  Meats and other proteins  Skinless chicken or turkey. Ground chicken or turkey. Pork with fat trimmed off. Fish and seafood. Egg whites. Dried beans, peas, or lentils. Unsalted nuts, nut butters, and seeds. Unsalted canned beans. Lean cuts of beef with fat trimmed off. Low-sodium, lean precooked or cured meat, such as sausages or meat loaves.  Dairy  Low-fat (1%) or fat-free (skim) milk. Reduced-fat, low-fat, or fat-free cheeses. Nonfat, low-sodium ricotta or cottage cheese. Low-fat or nonfat yogurt. Low-fat, low-sodium cheese.  Fats and oils  Soft margarine without trans fats. Vegetable oil. Reduced-fat, low-fat, or light mayonnaise and salad dressings (reduced-sodium). Canola, safflower, olive, avocado, soybean, and sunflower oils. Avocado.  Seasonings and condiments  Herbs. Spices. Seasoning mixes without salt.  Other foods  Unsalted popcorn and pretzels. Fat-free sweets.  The items listed above may not be a complete list of foods and beverages you can eat. Contact a dietitian for more information.  What foods should I avoid?  Fruits  Canned fruit in a light or heavy syrup. Fried fruit. Fruit in cream or butter sauce.  Vegetables  Creamed or fried vegetables. Vegetables in a cheese sauce. Regular canned vegetables (not low-sodium or reduced-sodium). Regular canned tomato sauce and paste (not low-sodium or reduced-sodium). Regular tomato and vegetable juice (not low-sodium or reduced-sodium). Pickles.  Olives.  Grains  Baked goods made with fat, such as croissants, muffins, or some breads. Dry pasta or rice meal packs.  Meats and other proteins  Fatty cuts of meat. Ribs. Fried meat. James. Bologna, salami, and other precooked or cured meats, such as sausages or meat loaves. Fat from the back of a pig (fatback). Bratwurst. Salted nuts and seeds. Canned beans with added salt. Canned or smoked fish. Whole eggs or egg yolks. Chicken or turkey with skin.  Dairy  Whole or 2% milk, cream, and half-and-half. Whole or full-fat cream cheese. Whole-fat or sweetened yogurt. Full-fat cheese. Nondairy creamers. Whipped toppings. Processed cheese and cheese spreads.  Fats and oils  Butter. Stick margarine. Lard. Shortening. Ghee. James fat. Tropical oils, such as coconut, palm kernel, or palm oil.  Seasonings and condiments  Onion salt, garlic salt, seasoned salt, table salt, and sea salt. [facility] sauce. Tartar sauce. Barbecue sauce. Teriyaki sauce. Soy sauce, including reduced-sodium. Steak sauce. Canned and packaged gravies. Fish sauce. Oyster sauce. Cocktail sauce. Store-bought horseradish. Ketchup. Mustard. Meat flavorings and tenderizers. Bouillon cubes. Hot sauces. Pre-made or packaged marinades. Pre-made or packaged taco seasonings. Relishes. Regular salad dressings.  Other foods  Salted popcorn and pretzels.  The items listed above may not be a complete list of foods and beverages you should avoid. Contact a dietitian for more information.  Where to find more information  National Heart, Lung, and Blood [place]: www.nhlbi.nih.gov  American Heart Association: www.heart.org  Academy of Nutrition and Dietetics: www.eatright.org  National Kidney Foundation: www.kidney.org  Summary  The DASH eating plan is a healthy eating plan that has been shown to reduce high blood pressure (hypertension). It may also reduce your risk for type 2 diabetes, heart disease, and stroke.  When on the DASH eating plan, aim to eat more  fresh fruits and vegetables, whole grains, lean proteins, low-fat dairy, and heart-healthy fats.  With the DASH eating plan, you should limit salt (sodium) intake to 2,300 mg a day. If you have hypertension, you may need to reduce your sodium intake to 1,500 mg a day.  Work with your health care provider or dietitian to adjust your eating plan to your individual calorie needs.  This information is not intended to replace advice given to you by your health care provider. Make sure you discuss any questions you have with your health care provider.  Document Revised: 11/20/2020 Document Reviewed: 11/20/2020  [employer] Patient Education © 2022 [employer]  Tobacco Use Disorder  Tobacco use disorder (TUD) occurs when a person craves, seeks, and uses tobacco, regardless of the consequences. This disorder can cause problems with mental and physical health. It can affect your ability to have healthy relationships, and it can keep you from meeting your responsibilities at work, home, or school.  Tobacco may be:  Smoked as a cigarette or cigar.  Inhaled using e-cigarettes.  Smoked in a pipe or hookah.  Chewed as smokeless tobacco.  Inhaled into the nostrils as snuff.  Tobacco products contain a dangerous chemical called nicotine, which is very addictive. Nicotine triggers hormones that make the body feel stimulated and works on areas of the brain that make you feel good. These effects can make it hard for people to quit nicotine.  Tobacco contains many other unsafe chemicals that can damage almost every organ in the body. Smoking tobacco also puts others in danger due to fire risk and possible health problems caused by breathing in secondhand smoke.  What are the signs or symptoms?  Symptoms of TUD may include:  Being unable to slow down or stop your tobacco use.  Spending an abnormal amount of time getting or using tobacco.  Craving tobacco. Cravings may last for up to 6 months after quitting.  Tobacco use that:  Interferes  with your work, school, or home life.  Interferes with your personal and social relationships.  Makes you give up activities that you once enjoyed or found important.  Using tobacco even though you know that it is:  Dangerous or bad for your health or someone else's health.  Causing problems in your life.  Needing more and more of the substance to get the same effect (developing tolerance).  Experiencing unpleasant symptoms if you do not use the substance (withdrawal). Withdrawal symptoms may include:  Depressed, anxious, or irritable mood.  Difficulty concentrating.  Increased appetite.  Restlessness or trouble sleeping.  Using the substance to avoid withdrawal.  How is this diagnosed?  This condition may be diagnosed based on:  Your current and past tobacco use. Your health care provider may ask questions about how your tobacco use affects your life.  A physical exam.  You may be diagnosed with TUD if you have at least two symptoms within a 12-month period.  How is this treated?  This condition is treated by stopping tobacco use. Many people are unable to quit on their own and need help. Treatment may include:  Nicotine replacement therapy (NRT). NRT provides nicotine without the other harmful chemicals in tobacco. NRT gradually lowers the dosage of nicotine in the body and reduces withdrawal symptoms. NRT is available as:  Over-the-counter gums, lozenges, and skin patches.  Prescription mouth inhalers and nasal sprays.  Medicine that acts on the brain to reduce cravings and withdrawal symptoms.  A type of talk therapy that examines your triggers for tobacco use, how to avoid them, and how to cope with cravings (behavioral therapy).  Hypnosis. This may help with withdrawal symptoms.  Joining a support group for others coping with TUD.  The best treatment for TUD is usually a combination of medicine, talk therapy, and support groups. Recovery can be a long process. Many people start using tobacco again after  stopping (relapse). If you relapse, it does not mean that treatment will not work.  Follow these instructions at home:  Lifestyle  Do not use any products that contain nicotine or tobacco, such as cigarettes and e-cigarettes.  Avoid things that trigger tobacco use as much as you can. Triggers include people and situations that usually cause you to use tobacco.  Avoid drinks that contain caffeine, including coffee. These may worsen some withdrawal symptoms.  Find ways to manage stress. Wanting to smoke may cause stress, and stress can make you want to smoke. Relaxation techniques such as deep breathing, meditation, and yoga may help.  Attend support groups as needed. These groups are an important part of long-term recovery for many people.  General instructions  Take over-the-counter and prescription medicines only as told by your health care provider.  Check with your health care provider before taking any new prescription or over-the-counter medicines.  Decide on a friend, family member, or smoking quit-line (such as 1-800-QUIT-NOW in the [place] that you can call or text when you feel the urge to smoke or when you need help coping with cravings.  Keep all follow-up visits as told by your health care provider and therapist. This is important.  Contact a health care provider if:  You are not able to take your medicines as prescribed.  Your symptoms get worse, even with treatment.  Summary  Tobacco use disorder (TUD) occurs when a person craves, seeks, and uses tobacco regardless of the consequences.  This condition may be diagnosed based on your current and past tobacco use and a physical exam.  Many people are unable to quit on their own and need help. Recovery can be a long process.  The most effective treatment for TUD is usually a combination of medicine, talk therapy, and support groups.  This information is not intended to replace advice given to you by your health care provider. Make sure you discuss any  questions you have with your health care provider.  Document Revised: 08/26/2022 Document Reviewed: 11/09/2021  [employer] Patient Education © 2022 [employer]  Electronic Cigarette Information  Electronic cigarettes, or e-cigarettes, are battery-operated devices that deliver nicotine--a very addictive drug--to the body. They come in many shapes, including in the shape of a cigarette, pipe, pen, and even a USB memory stick. Electronic cigarettes may be called e-cigs, e-hookahs, vape pens, and electronic nicotine delivery systems (ENDS).  E-cigarettes have a cartridge that contains a liquid form of nicotine. When a person uses the device, the liquid heats up. It then becomes a vapor that a person inhales. Using e-cigarettes may be called vaping.  Nicotine is thought to increase your risk for certain types of cancer. In addition to nicotine, e-cigarettes may contain other harmful and cancer-causing chemicals, including:  Formaldehyde.  Acetaldehyde.  Heavy metals.  Ultrafine particles that can get inhaled deep into the lungs.  Chemical colorings and flavorings.  It is not clear how much nicotine you get when vaping, and it is hard to know what chemicals are in the vaping liquids. The health effects of vaping are not completely known, but you should be aware of the possible dangers of using these products.  Some people may use e-cigarettes to quit smoking tobacco. However, this has not been proven to work, and the Food and Drug Administration (FDA) has not approved e-cigarettes for this purpose.  How can using electronic cigarettes affect me?  You may be at risk for developing a dangerous lung disease. There are reports of an increasing number of cases involving serious lung problems, and even death, associated with e-cigarette use. Your risk may be even higher if you:  Buy e-cigarettes or vaping oils off the street.  Add any substances to the e-cigarettes that are not intended by the .  Vaping may make you  crave nicotine. Nicotine does the following:  Changes your blood sugar levels.  Increases your heart rate, blood pressure, and breathing rate.  Increases your risk of developing blood clots (hypercoagulable state) and diabetes.  Increases your risk of gum disease that may lead to losing teeth.  If you smoke e-cigarettes, you may be more likely to start smoking or to smoke more tobacco cigarettes.  Becoming addicted to nicotine may make your brain more sensitive to other addictive drugs. You may move to other addictive substances.  You may be in danger of overdosing on nicotine. Nicotine poisoning can cause nausea, vomiting, seizures, and trouble breathing.  Vaping has also been linked to decreases in memory and attention span in children and teens.  If you are pregnant, the nicotine in e-cigarettes may be harmful to your baby. Nicotine can cause:  Brain or lung problems for your baby.  Your baby to be born too early.  Your baby to be born with a low birth weight.  What actions can I take to stop vaping?  If you can, stop vaping on your own before you become addicted to nicotine. If you need help quitting, ask your health care provider. There are three effective ways to fight nicotine addiction:  Nicotine replacement therapy. Using nicotine gum or a nicotine patch blocks your craving for nicotine. Over time, you can reduce the amount of nicotine you use until you can stop using nicotine completely without having cravings.  Prescription medicines approved to fight nicotine addiction. These stop nicotine cravings or block the effects of nicotine.  Behavioral therapy. This may include:  A self-help smoking cessation program.  Individual or group therapy.  A smoking cessation support group.  There are several national programs to help you quit smoking or vaping. These include:  Text message programs, such as SmokefreeTXT.  Apps for mobile phones, including the free EasyPaint edward.  Hotlines, such as 1-800-QUIT-NOW  (1-753.376.9622).  Where to find support  You can get support at these sites:  [place] Department of Health and Human Services: smokefree.gov  American Lung Association: www.lung.org  Where to find more information  Learn more about e-cigarettes from:  National Rugby on Drug Abuse: www.drugabuse.gov  Centers for Disease Control and Prevention: www.cdc.gov  Summary  E-cigarettes can cause nicotine addiction.  E-cigarettes are not approved as a way to stop smoking. They are not a risk-free alternative to smoking tobacco.  There are reports of an increasing number of cases involving serious lung problems, and even death, associated with e-cigarette use.  If you can stop vaping on your own, do it before you become addicted to nicotine. If you need help quitting, ask your health care provider.  There are various methods and programs that can help you stop smoking or vaping.  This information is not intended to replace advice given to you by your health care provider. Make sure you discuss any questions you have with your health care provider.  Document Revised: 09/14/2022 Document Reviewed: 09/14/2022  [employer]vier Patient Education © 2022 [employer]

## 2023-06-08 NOTE — PROGRESS NOTES
Patient: Cale Sebastian  : 1960    Primary Care Provider: Ganga Albarran DO    Requesting Provider: Vlad Srivastava APRN        History    Chief Complaint: AV fistula  Chief Complaint   Patient presents with    Abnormal Imaging     NEW PATIENT/VLAD SRIVASTAVA APRN:  patient with complaints of worsening hearing loss, light headedness, and headaches.  Work up reviewed abnormality.         History of Present Illness: 63-year-old gentleman who was seen by our ENT service for work-up of hearing loss and changes.  CT scan of the temporal bones revealed a possible AV malformation in his brain.  CT angiogram subsequently demonstrated a vascular abnormality.  He is here in consultation.    Review of Systems   All other systems reviewed and are negative.    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    Tinnitus        Past Surgical History:   Past Surgical History:   Procedure Laterality Date    COLONOSCOPY  2012    Diverticulosis repeat exam in 5 years    COLONOSCOPY  2012    Diverticulosis repeat exam in 5 years    COLONOSCOPY N/A 2017    Procedure: COLONOSCOPY WITH ANESTHESIA;  Surgeon: Terrence Wood MD;  Location:  [facility];  Service:     ENDOSCOPY  2016    LA Grade B Esohagitis repeat exam in 3 months    ENDOSCOPY  2012    THERESE, BERKLEY GEJ ESOPHAGITIS    FOOT SURGERY Left     AS A CHILD    HERNIA REPAIR      2ND GRADE    INGUINAL HERNIA REPAIR Left 2019    Procedure: INGUINAL HERNIA REPAIR WITH MESH;  Surgeon: Raven Meza MD;  Location:  [facility];  Service: General       Family History: family history includes Hypertension in his father; Stomach cancer in an other family member.    Social History:   reports that he has quit smoking. His smoking use included cigarettes. He has never used smokeless tobacco. He reports that he does not currently use alcohol. He reports that he does not use drugs.    Medications:  (Not in a hospital admission)      Allergies:  Patient has no known allergies.    Physical Exam:     Physical Exam  Eyes:      Extraocular Movements: EOM normal.      Pupils: Pupils are equal, round, and reactive to light.   Cardiovascular:      Rate and Rhythm: Normal rate and regular rhythm.   Pulmonary:      Effort: No respiratory distress.      Breath sounds: Normal breath sounds.   Abdominal:      General: There is no distension.      Palpations: Abdomen is soft.   Neurological:      Mental Status: He is oriented to person, place, and time.      Motor: Motor strength is normal.      Coordination: Finger-Nose-Finger Test normal.      Gait: Gait is intact.   Psychiatric:         Speech: Speech normal.       Neurologic Exam     Mental Status   Oriented to person, place, and time.   Attention: normal.   Speech: speech is normal   Level of consciousness: alert  Knowledge: good.     Cranial Nerves     CN II   Visual fields full to confrontation.     CN III, IV, VI   Pupils are equal, round, and reactive to light.  Extraocular motions are normal.     CN V   Facial sensation intact.     CN VII   Facial expression full, symmetric.     CN VIII   CN VIII normal.     CN IX, X   CN IX normal.   CN X normal.     CN XI   CN XI normal.     CN XII   CN XII normal.     Motor Exam   Muscle bulk: normal  Overall muscle tone: normal  Right arm pronator drift: absent  Left arm pronator drift: absent    Strength   Strength 5/5 throughout.     Sensory Exam   Light touch normal.   Pinprick normal.     Gait, Coordination, and Reflexes     Gait  Gait: normal    Coordination   Finger to nose coordination: normal    Tremor   Resting tremor: absent  Intention tremor: absent  Action tremor: absent    Reflexes   Reflexes 2+ except  as noted.       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  We discussed this at length.  I do think this needs to be evaluated by one of the endovascular neurosurgeons at [facility] for more definitive opinion on whether it needs to be treated.  They acknowledged understand this.  Their questions and concerns were addressed.  Make the referral and get that set up.  1. Dural arteriovenous fistula    2. Mixed conductive and sensorineural hearing loss of right ear, unspecified hearing status on contralateral side    3. Sensorineural hearing loss (SNHL) of left ear, unspecified hearing status on contralateral side    4. Former smoker    5. Vapes nicotine containing substance    6. Overweight with body mass index (BMI) of 27 to 27.9 in adult          No follow-ups on file.      Tej Mccann MD

## 2023-06-12 DIAGNOSIS — I67.1 DURAL ARTERIOVENOUS FISTULA: Primary | ICD-10-CM

## 2023-06-12 DIAGNOSIS — H90.5 SENSORINEURAL HEARING LOSS (SNHL) OF LEFT EAR, UNSPECIFIED HEARING STATUS ON CONTRALATERAL SIDE: ICD-10-CM

## 2023-06-12 DIAGNOSIS — H90.71 MIXED CONDUCTIVE AND SENSORINEURAL HEARING LOSS OF RIGHT EAR, UNSPECIFIED HEARING STATUS ON CONTRALATERAL SIDE: ICD-10-CM

## 2023-06-13 ENCOUNTER — TRANSCRIBE ORDERS (OUTPATIENT)
Dept: ADMINISTRATIVE | Facility: HOSPITAL | Age: 63
End: 2023-06-13
Payer: COMMERCIAL

## 2023-06-13 DIAGNOSIS — R91.1 LUNG NODULE: Primary | ICD-10-CM

## 2023-06-15 ENCOUNTER — TELEPHONE (OUTPATIENT)
Dept: NEUROSURGERY | Facility: CLINIC | Age: 63
End: 2023-06-15
Payer: COMMERCIAL

## 2023-06-15 NOTE — TELEPHONE ENCOUNTER
Patient's wife left a VM regarding the referral to [facility] for the patient.  They were seen last Thursday and a referral was made, she just states they haven't heard from them.    Jia contacted [facility] for me and they have the referral & it is in review.    I called the patient's wife back but she didn't answer so I left a VM letting her know and left her my direct # so she can contact me with any questions.  I did say that if they had not heard from [facility] by next week to let me know.    JULISSA MONTES [facility]  PHYSICIAN LEAD  DR RUDOLPH WASHBURN  [facility] NEUROSURGERY

## 2023-09-18 RX ORDER — CIPROFLOXACIN AND DEXAMETHASONE 3; 1 MG/ML; MG/ML
3 SUSPENSION/ DROPS AURICULAR (OTIC) 4 TIMES DAILY
Qty: 7.5 ML | Refills: 0 | Status: SHIPPED | OUTPATIENT
Start: 2023-09-18

## 2023-10-03 ENCOUNTER — TRANSCRIBE ORDERS (OUTPATIENT)
Dept: ADMINISTRATIVE | Facility: HOSPITAL | Age: 63
End: 2023-10-03
Payer: COMMERCIAL

## 2023-10-03 DIAGNOSIS — Q28.2 ARTERIOVENOUS MALFORMATION OF BRAIN: Primary | ICD-10-CM

## 2024-07-26 ENCOUNTER — HOSPITAL ENCOUNTER (OUTPATIENT)
Dept: CT IMAGING | Facility: HOSPITAL | Age: 64
Discharge: HOME | End: 2024-07-26
Admitting: INTERNAL MEDICINE
Payer: COMMERCIAL

## 2024-07-26 DIAGNOSIS — Z87.891 PERSONAL HISTORY OF TOBACCO USE, PRESENTING HAZARDS TO HEALTH: ICD-10-CM

## 2024-07-26 PROCEDURE — 71271 CT THORAX LUNG CANCER SCR C-: CPT

## (undated) DEVICE — SENSR O2 OXIMAX FNGR A/ 18IN NONSTR

## (undated) DEVICE — ELECTRD BLD EDGE/INSUL1P 2.4X5.1MM STRL

## (undated) DEVICE — ADHS LIQ MASTISOL 2/3ML

## (undated) DEVICE — PAD GRND REM POLYHESIVE A/ DISP

## (undated) DEVICE — DRSNG SURESITE WNDW 4X4.5

## (undated) DEVICE — CUFF,BP,DISP,1 TUBE,ADULT,HP: Brand: MEDLINE

## (undated) DEVICE — TRY PREP SCRB VAG PVP

## (undated) DEVICE — NDL CNTR 40CT FM MAG: Brand: MEDLINE INDUSTRIES, INC.

## (undated) DEVICE — TBG SMPL FLTR LINE NASL 02/C02 A/ BX/100

## (undated) DEVICE — SPNG GZ STRL 2S 4X4 12PLY

## (undated) DEVICE — 3M™ IOBAN™ 2 ANTIMICROBIAL INCISE DRAPE 6650EZ: Brand: IOBAN™ 2

## (undated) DEVICE — ENDOGATOR AUXILIARY WATER JET CONNECTOR: Brand: ENDOGATOR

## (undated) DEVICE — SUT PROLN 0 MO7 CR8 18IN C841G

## (undated) DEVICE — PK TURNOVER RM ADV

## (undated) DEVICE — 3M™ STERI-STRIP™ REINFORCED ADHESIVE SKIN CLOSURES, R1547, 1/2 IN X 4 IN (12 MM X 100 MM), 6 STRIPS/ENVELOPE: Brand: 3M™ STERI-STRIP™

## (undated) DEVICE — GLV SURG BIOGEL LTX PF 6 1/2

## (undated) DEVICE — PAD MINOR UNIVERSAL: Brand: MEDLINE INDUSTRIES, INC.

## (undated) DEVICE — THE CHANNEL CLEANING BRUSH IS A NYLON FLEXI BRUSH ATTACHED TO A FLEXIBLE PLASTIC SHEATH DESIGNED TO SAFELY REMOVE DEBRIS FROM FLEXIBLE ENDOSCOPES.

## (undated) DEVICE — ANTIBACTERIAL UNDYED BRAIDED (POLYGLACTIN 910), SYNTHETIC ABSORBABLE SUTURE: Brand: COATED VICRYL

## (undated) DEVICE — MSK O2 MD CONCENTR A/ LF 7FT 1P/U

## (undated) DEVICE — Device: Brand: DEFENDO AIR/WATER/SUCTION AND BIOPSY VALVE